# Patient Record
Sex: FEMALE | URBAN - METROPOLITAN AREA
[De-identification: names, ages, dates, MRNs, and addresses within clinical notes are randomized per-mention and may not be internally consistent; named-entity substitution may affect disease eponyms.]

---

## 2017-10-23 ENCOUNTER — EMERGENCY (EMERGENCY)
Facility: HOSPITAL | Age: 27
LOS: 1 days | Discharge: HOME | End: 2017-10-23
Attending: EMERGENCY MEDICINE
Payer: MEDICAID

## 2017-10-23 VITALS
WEIGHT: 97 LBS | TEMPERATURE: 98 F | SYSTOLIC BLOOD PRESSURE: 101 MMHG | OXYGEN SATURATION: 100 % | DIASTOLIC BLOOD PRESSURE: 68 MMHG | RESPIRATION RATE: 16 BRPM | HEIGHT: 62 IN | HEART RATE: 96 BPM

## 2017-10-23 VITALS
OXYGEN SATURATION: 100 % | DIASTOLIC BLOOD PRESSURE: 70 MMHG | RESPIRATION RATE: 90 BRPM | SYSTOLIC BLOOD PRESSURE: 100 MMHG | HEART RATE: 72 BPM | TEMPERATURE: 98 F

## 2017-10-23 DIAGNOSIS — R51 HEADACHE: ICD-10-CM

## 2017-10-23 DIAGNOSIS — Z88.0 ALLERGY STATUS TO PENICILLIN: ICD-10-CM

## 2017-10-23 LAB
ALBUMIN SERPL ELPH-MCNC: 3 G/DL — LOW (ref 3.5–5)
ALP SERPL-CCNC: 40 U/L — SIGNIFICANT CHANGE UP (ref 40–120)
ALT FLD-CCNC: 27 U/L DA — SIGNIFICANT CHANGE UP (ref 10–60)
ANION GAP SERPL CALC-SCNC: 9 MMOL/L — SIGNIFICANT CHANGE UP (ref 5–17)
APPEARANCE UR: CLEAR — SIGNIFICANT CHANGE UP
AST SERPL-CCNC: 32 U/L — SIGNIFICANT CHANGE UP (ref 10–40)
BILIRUB SERPL-MCNC: 0.4 MG/DL — SIGNIFICANT CHANGE UP (ref 0.2–1.2)
BILIRUB UR-MCNC: NEGATIVE — SIGNIFICANT CHANGE UP
BUN SERPL-MCNC: 11 MG/DL — SIGNIFICANT CHANGE UP (ref 7–18)
CALCIUM SERPL-MCNC: 8.7 MG/DL — SIGNIFICANT CHANGE UP (ref 8.4–10.5)
CHLORIDE SERPL-SCNC: 103 MMOL/L — SIGNIFICANT CHANGE UP (ref 96–108)
CO2 SERPL-SCNC: 24 MMOL/L — SIGNIFICANT CHANGE UP (ref 22–31)
COLOR SPEC: YELLOW — SIGNIFICANT CHANGE UP
CREAT SERPL-MCNC: 0.57 MG/DL — SIGNIFICANT CHANGE UP (ref 0.5–1.3)
DIFF PNL FLD: ABNORMAL
GLUCOSE SERPL-MCNC: 102 MG/DL — HIGH (ref 70–99)
GLUCOSE UR QL: NEGATIVE — SIGNIFICANT CHANGE UP
HCG SERPL-ACNC: <1 MIU/ML — SIGNIFICANT CHANGE UP
HCT VFR BLD CALC: 35.4 % — SIGNIFICANT CHANGE UP (ref 34.5–45)
HGB BLD-MCNC: 10.8 G/DL — LOW (ref 11.5–15.5)
KETONES UR-MCNC: ABNORMAL
LEUKOCYTE ESTERASE UR-ACNC: ABNORMAL
MCHC RBC-ENTMCNC: 26.9 PG — LOW (ref 27–34)
MCHC RBC-ENTMCNC: 30.7 GM/DL — LOW (ref 32–36)
MCV RBC AUTO: 87.5 FL — SIGNIFICANT CHANGE UP (ref 80–100)
NITRITE UR-MCNC: NEGATIVE — SIGNIFICANT CHANGE UP
PH UR: 5 — SIGNIFICANT CHANGE UP (ref 5–8)
PLATELET # BLD AUTO: 326 K/UL — SIGNIFICANT CHANGE UP (ref 150–400)
POTASSIUM SERPL-MCNC: 3.5 MMOL/L — SIGNIFICANT CHANGE UP (ref 3.5–5.3)
POTASSIUM SERPL-SCNC: 3.5 MMOL/L — SIGNIFICANT CHANGE UP (ref 3.5–5.3)
PROT SERPL-MCNC: 8.2 G/DL — SIGNIFICANT CHANGE UP (ref 6–8.3)
PROT UR-MCNC: 30 MG/DL
RBC # BLD: 4.04 M/UL — SIGNIFICANT CHANGE UP (ref 3.8–5.2)
RBC # FLD: 14.6 % — HIGH (ref 10.3–14.5)
SODIUM SERPL-SCNC: 136 MMOL/L — SIGNIFICANT CHANGE UP (ref 135–145)
SP GR SPEC: 1.02 — SIGNIFICANT CHANGE UP (ref 1.01–1.02)
UROBILINOGEN FLD QL: NEGATIVE — SIGNIFICANT CHANGE UP
WBC # BLD: 4.4 K/UL — SIGNIFICANT CHANGE UP (ref 3.8–10.5)
WBC # FLD AUTO: 4.4 K/UL — SIGNIFICANT CHANGE UP (ref 3.8–10.5)

## 2017-10-23 PROCEDURE — 81001 URINALYSIS AUTO W/SCOPE: CPT

## 2017-10-23 PROCEDURE — 70450 CT HEAD/BRAIN W/O DYE: CPT | Mod: 26

## 2017-10-23 PROCEDURE — 96375 TX/PRO/DX INJ NEW DRUG ADDON: CPT

## 2017-10-23 PROCEDURE — 96374 THER/PROPH/DIAG INJ IV PUSH: CPT

## 2017-10-23 PROCEDURE — 85027 COMPLETE CBC AUTOMATED: CPT

## 2017-10-23 PROCEDURE — 70450 CT HEAD/BRAIN W/O DYE: CPT

## 2017-10-23 PROCEDURE — 99284 EMERGENCY DEPT VISIT MOD MDM: CPT | Mod: 25

## 2017-10-23 PROCEDURE — 84702 CHORIONIC GONADOTROPIN TEST: CPT

## 2017-10-23 PROCEDURE — 87086 URINE CULTURE/COLONY COUNT: CPT

## 2017-10-23 PROCEDURE — 80053 COMPREHEN METABOLIC PANEL: CPT

## 2017-10-23 RX ORDER — METOCLOPRAMIDE HCL 10 MG
10 TABLET ORAL ONCE
Qty: 0 | Refills: 0 | Status: COMPLETED | OUTPATIENT
Start: 2017-10-23 | End: 2017-10-23

## 2017-10-23 RX ORDER — SODIUM CHLORIDE 9 MG/ML
1000 INJECTION, SOLUTION INTRAVENOUS
Qty: 0 | Refills: 0 | Status: DISCONTINUED | OUTPATIENT
Start: 2017-10-23 | End: 2017-10-27

## 2017-10-23 RX ORDER — DIPHENHYDRAMINE HCL 50 MG
25 CAPSULE ORAL ONCE
Qty: 0 | Refills: 0 | Status: COMPLETED | OUTPATIENT
Start: 2017-10-23 | End: 2017-10-23

## 2017-10-23 RX ORDER — ACETAMINOPHEN 500 MG
1000 TABLET ORAL ONCE
Qty: 0 | Refills: 0 | Status: COMPLETED | OUTPATIENT
Start: 2017-10-23 | End: 2017-10-23

## 2017-10-23 RX ORDER — KETOROLAC TROMETHAMINE 30 MG/ML
30 SYRINGE (ML) INJECTION ONCE
Qty: 0 | Refills: 0 | Status: DISCONTINUED | OUTPATIENT
Start: 2017-10-23 | End: 2017-10-23

## 2017-10-23 RX ORDER — NITROFURANTOIN MACROCRYSTAL 50 MG
1 CAPSULE ORAL
Qty: 14 | Refills: 0 | OUTPATIENT
Start: 2017-10-23 | End: 2017-10-30

## 2017-10-23 RX ORDER — NITROFURANTOIN MACROCRYSTAL 50 MG
100 CAPSULE ORAL
Qty: 0 | Refills: 0 | Status: DISCONTINUED | OUTPATIENT
Start: 2017-10-23 | End: 2017-10-27

## 2017-10-23 RX ORDER — SODIUM CHLORIDE 9 MG/ML
1000 INJECTION INTRAMUSCULAR; INTRAVENOUS; SUBCUTANEOUS ONCE
Qty: 0 | Refills: 0 | Status: COMPLETED | OUTPATIENT
Start: 2017-10-23 | End: 2017-10-23

## 2017-10-23 RX ADMIN — Medication 1000 MILLIGRAM(S): at 07:31

## 2017-10-23 RX ADMIN — Medication 30 MILLIGRAM(S): at 05:27

## 2017-10-23 RX ADMIN — SODIUM CHLORIDE 1000 MILLILITER(S): 9 INJECTION, SOLUTION INTRAVENOUS at 05:25

## 2017-10-23 RX ADMIN — Medication 100 MILLIGRAM(S): at 05:25

## 2017-10-23 RX ADMIN — Medication 400 MILLIGRAM(S): at 06:33

## 2017-10-23 RX ADMIN — Medication 10 MILLIGRAM(S): at 02:40

## 2017-10-23 RX ADMIN — Medication 25 MILLIGRAM(S): at 02:39

## 2017-10-23 RX ADMIN — Medication 30 MILLIGRAM(S): at 06:35

## 2017-10-23 RX ADMIN — SODIUM CHLORIDE 1000 MILLILITER(S): 9 INJECTION INTRAMUSCULAR; INTRAVENOUS; SUBCUTANEOUS at 02:39

## 2017-10-23 NOTE — ED PROVIDER NOTE - OBJECTIVE STATEMENT
26 y/o F w/ no significant PMHx presents to the ED c/o worsening, throbbing HA x3 days. Pt also notes hx of HA long time ago and states Sx are similar to previous [facility]. Pt has had the following tests performed: abd US, cbc w/ differential, thyroid function test, hormone panel, prolactin test, hemoglobin a1c, UA, CCP, ferritin, Folate, HLA-B27, Iron & TIBC, LDH, Lyme Dz, AB reflex west-blot IGG/IGM, protein electro serum, vitamin b12, CMP, Creatinine w/ GFR, arthritis panel, anti-nuclear antibodies reflex IFA, Liquid panel, LDL cholesterol, CALC [5], CT A/P, chest (thorax), and soft neck tissue. Pt is on medroxyprogesterone 10 mg. Pt states she took Tylenol & Ibuprofen to no relief. PMD: Dr. Myles. Pt denies fever, chills, photophobia, weakness in arms or legs, or any other complaints. Pt is allergic to Penicillin. 28 y/o F w/ no significant PMHx presents to the ED c/o worsening, throbbing HA x3 days. Pt also notes hx of HA in past, but hasn't had one for a long time, however she states Sx are similar to previous HA. Pt has had non specific s/s such as joints pains/ weight loss, lymphadenopathy, which prompted extensive workup- has had the following tests performed this year: abd US, cbc w/ differential, thyroid function test, hormone panel, prolactin test, hemoglobin a1c, UA, CCP, ferritin, Folate, HLA-B27, Iron & TIBC, LDH, Lyme Dz, AB reflex west-blot IGG/IGM, protein electro serum, vitamin b12, CMP, Creatinine w/ GFR, arthritis panel, anti-nuclear antibodies reflex IFA, Liquid panel, LDL cholesterol, CALC [5], CT A/P, chest (thorax), and soft neck tissue. Pt is on medroxyprogesterone 10 mg. Has seen Rheum/ hem-onc, syndrome thought to be Rheum but no specific dx given as of yet.  Pt states she took Tylenol & Ibuprofen to no relief. PMD: Dr. Myles (has appoint today). Pt denies fever, chills, photophobia, weakness in arms or legs, or any other complaints. Pt is allergic to Penicillin.

## 2017-10-24 ENCOUNTER — EMERGENCY (EMERGENCY)
Facility: HOSPITAL | Age: 27
LOS: 1 days | Discharge: HOME | End: 2017-10-24
Attending: EMERGENCY MEDICINE
Payer: MEDICAID

## 2017-10-24 VITALS
HEART RATE: 69 BPM | DIASTOLIC BLOOD PRESSURE: 65 MMHG | SYSTOLIC BLOOD PRESSURE: 94 MMHG | RESPIRATION RATE: 17 BRPM | OXYGEN SATURATION: 99 %

## 2017-10-24 VITALS
WEIGHT: 98.11 LBS | RESPIRATION RATE: 18 BRPM | HEIGHT: 62 IN | SYSTOLIC BLOOD PRESSURE: 100 MMHG | TEMPERATURE: 99 F | HEART RATE: 85 BPM | DIASTOLIC BLOOD PRESSURE: 72 MMHG | OXYGEN SATURATION: 98 %

## 2017-10-24 DIAGNOSIS — Z88.0 ALLERGY STATUS TO PENICILLIN: ICD-10-CM

## 2017-10-24 DIAGNOSIS — G43.911 MIGRAINE, UNSPECIFIED, INTRACTABLE, WITH STATUS MIGRAINOSUS: ICD-10-CM

## 2017-10-24 LAB
CULTURE RESULTS: NO GROWTH — SIGNIFICANT CHANGE UP
SPECIMEN SOURCE: SIGNIFICANT CHANGE UP

## 2017-10-24 PROCEDURE — 96374 THER/PROPH/DIAG INJ IV PUSH: CPT

## 2017-10-24 PROCEDURE — 99284 EMERGENCY DEPT VISIT MOD MDM: CPT | Mod: 25

## 2017-10-24 PROCEDURE — 96375 TX/PRO/DX INJ NEW DRUG ADDON: CPT

## 2017-10-24 PROCEDURE — 99284 EMERGENCY DEPT VISIT MOD MDM: CPT

## 2017-10-24 RX ORDER — METOCLOPRAMIDE HCL 10 MG
10 TABLET ORAL ONCE
Qty: 0 | Refills: 0 | Status: COMPLETED | OUTPATIENT
Start: 2017-10-24 | End: 2017-10-24

## 2017-10-24 RX ORDER — DIPHENHYDRAMINE HCL 50 MG
25 CAPSULE ORAL ONCE
Qty: 0 | Refills: 0 | Status: COMPLETED | OUTPATIENT
Start: 2017-10-24 | End: 2017-10-24

## 2017-10-24 RX ORDER — KETOROLAC TROMETHAMINE 30 MG/ML
30 SYRINGE (ML) INJECTION ONCE
Qty: 0 | Refills: 0 | Status: DISCONTINUED | OUTPATIENT
Start: 2017-10-24 | End: 2017-10-24

## 2017-10-24 RX ORDER — DEXAMETHASONE 0.5 MG/5ML
6 ELIXIR ORAL ONCE
Qty: 0 | Refills: 0 | Status: COMPLETED | OUTPATIENT
Start: 2017-10-24 | End: 2017-10-24

## 2017-10-24 RX ORDER — SODIUM CHLORIDE 9 MG/ML
1000 INJECTION INTRAMUSCULAR; INTRAVENOUS; SUBCUTANEOUS ONCE
Qty: 0 | Refills: 0 | Status: COMPLETED | OUTPATIENT
Start: 2017-10-24 | End: 2017-10-24

## 2017-10-24 RX ADMIN — Medication 10 MILLIGRAM(S): at 12:54

## 2017-10-24 RX ADMIN — Medication 30 MILLIGRAM(S): at 13:39

## 2017-10-24 RX ADMIN — Medication 30 MILLIGRAM(S): at 12:54

## 2017-10-24 RX ADMIN — Medication 6 MILLIGRAM(S): at 13:53

## 2017-10-24 RX ADMIN — SODIUM CHLORIDE 1000 MILLILITER(S): 9 INJECTION INTRAMUSCULAR; INTRAVENOUS; SUBCUTANEOUS at 12:54

## 2017-10-24 RX ADMIN — Medication 25 MILLIGRAM(S): at 12:54

## 2017-10-24 NOTE — ED ADULT TRIAGE NOTE - CHIEF COMPLAINT QUOTE
accompanied by father c/o dizziness , vomiting after taking Sumatripan from last night , was here 2 days ago for migraine headaches

## 2017-10-24 NOTE — ED ADULT NURSE NOTE - OBJECTIVE STATEMENT
pt from home c/o of generalized headache pt is alert oriented x3 no acute respiratory distress noted no active vomiting at this time

## 2017-10-24 NOTE — ED PROVIDER NOTE - CRANIAL NERVE AND PUPILLARY EXAM
gag reflex intact/tongue is midline/cranial nerves 2-12 intact/central and peripheral vision intact/extra-ocular movements intact

## 2017-10-24 NOTE — ED PROVIDER NOTE - MEDICAL DECISION MAKING DETAILS
Pt had extensive workup yesterday to include negative head CT and no neuro deficits today.  Will treat migraine and reassess for relief.

## 2017-10-24 NOTE — ED PROVIDER NOTE - CROS ED NEURO POS
Your current Orthopaedic problem we are working together to treat is:  LeftTotal Knee Replacement.    It is recommended you schedule a follow-up appointment with Griffin Steven MD  4 months after surgery.     Office hours are 8:00 am to 5:00 pm Monday through Friday. If it is urgent that you speak with someone outside of these hours, our [facility] Call Center will be able to assist you. You can reach the office by calling the [place] central scheduling line at [phone].     We want to give you the best care possible. If you receive a Press Ganey survey from our office, please take the time to fill out the survey and return it in the envelope provided. If you will be giving us a score of 8 or less on the survey please consider calling to discuss this with us personally. Your feedback helps us know how we are doing and we really appreciate it.     Thank you for choosing [facility] as your Orthopaedic provider!    Continue to ride your bike and work on range of motion of your knee.     HEADACHE/DIFFICULTY WALKING / IMBALANCE

## 2017-10-24 NOTE — ED PROVIDER NOTE - OBJECTIVE STATEMENT
28 y/o female who has had recent negative extensive workup to include CT head, neck, prolactin, Lyme titers, CBC, CMP p/w weakness and continued migraine after taking Imitrex for the 1st time yesterday.  She took 4 doses throughout the day and then her father noted that she was less responsive and somnolent.   Pt c/o diffuse dull headache but denies f/c/r, neck pain/stiffness or rash.  Pt has had nausea but no vomiting.  Pt was seen in this ED in the last 24 hours and had a negative workup.

## 2018-05-08 ENCOUNTER — APPOINTMENT (OUTPATIENT)
Dept: RHEUMATOLOGY | Facility: CLINIC | Age: 28
End: 2018-05-08
Payer: MEDICAID

## 2018-05-08 ENCOUNTER — LABORATORY RESULT (OUTPATIENT)
Age: 28
End: 2018-05-08

## 2018-05-08 VITALS
OXYGEN SATURATION: 97 % | WEIGHT: 112 LBS | TEMPERATURE: 98.1 F | SYSTOLIC BLOOD PRESSURE: 103 MMHG | HEIGHT: 62 IN | DIASTOLIC BLOOD PRESSURE: 67 MMHG | HEART RATE: 80 BPM | BODY MASS INDEX: 20.61 KG/M2

## 2018-05-08 DIAGNOSIS — Z87.39 PERSONAL HISTORY OF OTHER DISEASES OF THE MUSCULOSKELETAL SYSTEM AND CONNECTIVE TISSUE: ICD-10-CM

## 2018-05-08 DIAGNOSIS — Z86.59 PERSONAL HISTORY OF OTHER MENTAL AND BEHAVIORAL DISORDERS: ICD-10-CM

## 2018-05-08 DIAGNOSIS — Z78.9 OTHER SPECIFIED HEALTH STATUS: ICD-10-CM

## 2018-05-08 LAB
ALBUMIN SERPL ELPH-MCNC: 4.1 G/DL
ALP BLD-CCNC: 46 U/L
ALT SERPL-CCNC: 44 U/L
ANION GAP SERPL CALC-SCNC: 17 MMOL/L
APPEARANCE: CLEAR
APTT BLD: 32.8 SEC
AST SERPL-CCNC: 31 U/L
BACTERIA: NEGATIVE
BASOPHILS # BLD AUTO: 0.02 K/UL
BASOPHILS NFR BLD AUTO: 0.3 %
BILIRUB SERPL-MCNC: 0.4 MG/DL
BILIRUBIN URINE: NEGATIVE
BLOOD URINE: NEGATIVE
BUN SERPL-MCNC: 12 MG/DL
CALCIUM SERPL-MCNC: 9.4 MG/DL
CHLORIDE SERPL-SCNC: 99 MMOL/L
CO2 SERPL-SCNC: 25 MMOL/L
COLOR: YELLOW
CREAT SERPL-MCNC: 0.73 MG/DL
CREAT SPEC-SCNC: 26 MG/DL
CREAT/PROT UR: 0.2 RATIO
CRP SERPL-MCNC: <0.2 MG/DL
EOSINOPHIL # BLD AUTO: 0.01 K/UL
EOSINOPHIL NFR BLD AUTO: 0.1 %
ERYTHROCYTE [SEDIMENTATION RATE] IN BLOOD BY WESTERGREN METHOD: 25 MM/HR
GLUCOSE QUALITATIVE U: NEGATIVE MG/DL
GLUCOSE SERPL-MCNC: 141 MG/DL
HCT VFR BLD CALC: 41.4 %
HGB BLD-MCNC: 13.1 G/DL
HYALINE CASTS: 1 /LPF
IMM GRANULOCYTES NFR BLD AUTO: 1.4 %
KETONES URINE: NEGATIVE
LEUKOCYTE ESTERASE URINE: ABNORMAL
LYMPHOCYTES # BLD AUTO: 1.29 K/UL
LYMPHOCYTES NFR BLD AUTO: 16.4 %
MAN DIFF?: NORMAL
MCHC RBC-ENTMCNC: 28 PG
MCHC RBC-ENTMCNC: 31.6 GM/DL
MCV RBC AUTO: 88.5 FL
MICROSCOPIC-UA: NORMAL
MONOCYTES # BLD AUTO: 0.11 K/UL
MONOCYTES NFR BLD AUTO: 1.4 %
NEUTROPHILS # BLD AUTO: 6.34 K/UL
NEUTROPHILS NFR BLD AUTO: 80.4 %
NITRITE URINE: NEGATIVE
PH URINE: 7
PLATELET # BLD AUTO: 495 K/UL
POTASSIUM SERPL-SCNC: 4.4 MMOL/L
PROT SERPL-MCNC: 8.1 G/DL
PROT UR-MCNC: 4 MG/DL
PROTEIN URINE: NEGATIVE MG/DL
RBC # BLD: 4.68 M/UL
RBC # FLD: 16.1 %
RED BLOOD CELLS URINE: 0 /HPF
RHEUMATOID FACT SER QL: <7 IU/ML
SODIUM SERPL-SCNC: 141 MMOL/L
SPECIFIC GRAVITY URINE: 1.01
SQUAMOUS EPITHELIAL CELLS: 2 /HPF
THYROGLOB AB SERPL-ACNC: <20 IU/ML
THYROPEROXIDASE AB SERPL IA-ACNC: 10.7 IU/ML
TSH SERPL-ACNC: 1.27 UIU/ML
UROBILINOGEN URINE: NEGATIVE MG/DL
WBC # FLD AUTO: 7.88 K/UL
WHITE BLOOD CELLS URINE: 1 /HPF

## 2018-05-08 PROCEDURE — 99204 OFFICE O/P NEW MOD 45 MIN: CPT

## 2018-05-09 LAB
25(OH)D3 SERPL-MCNC: 23.2 NG/ML
B2 GLYCOPROT1 AB SER QL: POSITIVE
CARDIOLIPIN AB SER IA-ACNC: NEGATIVE
CCP AB SER IA-ACNC: <8 UNITS
CENTROMERE IGG SER-ACNC: <0.2 AL
DEPRECATED KAPPA LC FREE/LAMBDA SER: 0.83 RATIO
DSDNA AB SER-ACNC: 30 IU/ML
ENA RNP AB SER IA-ACNC: 0.4 AL
ENA SCL70 IGG SER IA-ACNC: <0.2 AL
ENA SM AB SER IA-ACNC: 2.1 AL
ENA SS-A AB SER IA-ACNC: >8 AL
ENA SS-B AB SER IA-ACNC: <0.2 AL
IGA SER QL IEP: 213 MG/DL
IGG SER QL IEP: 2140 MG/DL
IGM SER QL IEP: 117 MG/DL
KAPPA LC CSF-MCNC: 2.19 MG/DL
KAPPA LC SERPL-MCNC: 1.81 MG/DL
RF+CCP IGG SER-IMP: NEGATIVE

## 2018-05-10 LAB
ALBUMIN MFR SERPL ELPH: 52.3 %
ALBUMIN SERPL-MCNC: 4.2 G/DL
ALBUMIN/GLOB SERPL: 1.1 RATIO
ALPHA1 GLOB MFR SERPL ELPH: 3.7 %
ALPHA1 GLOB SERPL ELPH-MCNC: 0.3 G/DL
ALPHA2 GLOB MFR SERPL ELPH: 8.4 %
ALPHA2 GLOB SERPL ELPH-MCNC: 0.7 G/DL
B-GLOBULIN MFR SERPL ELPH: 9.5 %
B-GLOBULIN SERPL ELPH-MCNC: 0.8 G/DL
GAMMA GLOB FLD ELPH-MCNC: 2.1 G/DL
GAMMA GLOB MFR SERPL ELPH: 26.1 %
IGA 24H UR QL IFE: NORMAL
INTERPRETATION SERPL IEP-IMP: NORMAL
M PROTEIN SPEC IFE-MCNC: NORMAL
PROT SERPL-MCNC: 8.1 G/DL
PROT SERPL-MCNC: 8.1 G/DL

## 2018-05-11 LAB — ANA SER IF-ACNC: NEGATIVE

## 2018-05-12 LAB
IGG4 SER-MCNC: 42.7 MG/DL
RNA POLYMERASE III IGG: 14.8 U

## 2018-05-14 LAB
ALBUPE: 55.6 %
ALPHA1UPE: 8.6 %
ALPHA2UPE: 15.6 %
BETAUPE: 7 %
GAMMAUPE: 13.2 %
IGA 24H UR QL IFE: NORMAL
KAPPA LC 24H UR QL: NORMAL
PROT PATTERN 24H UR ELPH-IMP: NORMAL
PROT UR-MCNC: 5 MG/DL
PROT UR-MCNC: <4 MG/DL

## 2018-05-15 LAB
ALBUPE: 55.6 %
ALPHA1UPE: 8.6 %
ALPHA2UPE: 15.6 %
BETAUPE: 7 %
CREAT 24H UR-MCNC: NORMAL G/24 H
CREATININE UR (MAYO): 41 MG/DL
GAMMAUPE: 13.2 %
IGA 24H UR QL IFE: NORMAL
IL6 SERPL-MCNC: 1.6 PG/ML
KAPPA LC 24H UR QL: NORMAL
PROT PATTERN 24H UR ELPH-IMP: NORMAL
PROT UR-MCNC: 5 MG/DL
PROT UR-MCNC: <4 MG/DL
SPECIMEN VOL 24H UR: NORMAL ML

## 2018-05-21 ENCOUNTER — TRANSCRIPTION ENCOUNTER (OUTPATIENT)
Age: 28
End: 2018-05-21

## 2018-05-21 ENCOUNTER — APPOINTMENT (OUTPATIENT)
Dept: RHEUMATOLOGY | Facility: CLINIC | Age: 28
End: 2018-05-21
Payer: MEDICAID

## 2018-05-21 VITALS
RESPIRATION RATE: 16 BRPM | WEIGHT: 114 LBS | TEMPERATURE: 98.4 F | DIASTOLIC BLOOD PRESSURE: 46 MMHG | HEART RATE: 83 BPM | BODY MASS INDEX: 20.98 KG/M2 | SYSTOLIC BLOOD PRESSURE: 88 MMHG | OXYGEN SATURATION: 98 % | HEIGHT: 62 IN

## 2018-05-21 DIAGNOSIS — A68.9 RELAPSING FEVER, UNSPECIFIED: ICD-10-CM

## 2018-05-21 PROCEDURE — 99213 OFFICE O/P EST LOW 20 MIN: CPT

## 2018-05-29 ENCOUNTER — LABORATORY RESULT (OUTPATIENT)
Age: 28
End: 2018-05-29

## 2018-06-18 ENCOUNTER — APPOINTMENT (OUTPATIENT)
Dept: RHEUMATOLOGY | Facility: CLINIC | Age: 28
End: 2018-06-18
Payer: MEDICAID

## 2018-06-18 VITALS
HEIGHT: 62 IN | DIASTOLIC BLOOD PRESSURE: 52 MMHG | HEART RATE: 67 BPM | OXYGEN SATURATION: 99 % | SYSTOLIC BLOOD PRESSURE: 80 MMHG | RESPIRATION RATE: 16 BRPM | WEIGHT: 116 LBS | BODY MASS INDEX: 21.35 KG/M2 | TEMPERATURE: 97.7 F

## 2018-06-18 PROBLEM — Z78.9 NON-SMOKER: Status: ACTIVE | Noted: 2018-05-08

## 2018-06-18 PROCEDURE — 99213 OFFICE O/P EST LOW 20 MIN: CPT

## 2018-06-18 RX ORDER — GLUC/MSM/COLGN2/HYAL/ANTIARTH3 375-375-20
TABLET ORAL
Refills: 0 | Status: ACTIVE | COMMUNITY

## 2018-06-18 RX ORDER — MULTIVITAMIN
TABLET ORAL
Refills: 0 | Status: ACTIVE | COMMUNITY

## 2018-06-18 RX ORDER — PSYLLIUM HUSK 0.4 G
CAPSULE ORAL
Refills: 0 | Status: ACTIVE | COMMUNITY

## 2018-06-18 RX ORDER — MAGNESIUM OXIDE/MAG AA CHELATE 300 MG
CAPSULE ORAL
Refills: 0 | Status: ACTIVE | COMMUNITY

## 2018-06-20 ENCOUNTER — TRANSCRIPTION ENCOUNTER (OUTPATIENT)
Age: 28
End: 2018-06-20

## 2018-06-26 ENCOUNTER — APPOINTMENT (OUTPATIENT)
Dept: OPHTHALMOLOGY | Facility: CLINIC | Age: 28
End: 2018-06-26
Payer: MEDICAID

## 2018-06-26 PROCEDURE — 92083 EXTENDED VISUAL FIELD XM: CPT

## 2018-06-26 PROCEDURE — 92004 COMPRE OPH EXAM NEW PT 1/>: CPT

## 2018-06-26 PROCEDURE — 92134 CPTRZ OPH DX IMG PST SGM RTA: CPT

## 2018-07-24 ENCOUNTER — TRANSCRIPTION ENCOUNTER (OUTPATIENT)
Age: 28
End: 2018-07-24

## 2018-08-14 ENCOUNTER — RX RENEWAL (OUTPATIENT)
Age: 28
End: 2018-08-14

## 2018-08-14 LAB
ALBUMIN SERPL ELPH-MCNC: 3.9 G/DL
ALP BLD-CCNC: 40 U/L
ALT SERPL-CCNC: 11 U/L
ANION GAP SERPL CALC-SCNC: 16 MMOL/L
APPEARANCE: CLEAR
AST SERPL-CCNC: 23 U/L
BACTERIA: NEGATIVE
BASOPHILS # BLD AUTO: 0.01 K/UL
BASOPHILS NFR BLD AUTO: 0.3 %
BILIRUB SERPL-MCNC: 0.7 MG/DL
BILIRUBIN URINE: NEGATIVE
BLOOD URINE: NEGATIVE
BUN SERPL-MCNC: 9 MG/DL
C3 SERPL-MCNC: 108 MG/DL
C4 SERPL-MCNC: 23 MG/DL
CALCIUM SERPL-MCNC: 9.5 MG/DL
CHLORIDE SERPL-SCNC: 99 MMOL/L
CO2 SERPL-SCNC: 23 MMOL/L
COLOR: YELLOW
CREAT SERPL-MCNC: 0.55 MG/DL
CREAT SPEC-SCNC: 18 MG/DL
CREAT/PROT UR: NORMAL
DSDNA AB SER-ACNC: 18 IU/ML
EOSINOPHIL # BLD AUTO: 0.18 K/UL
EOSINOPHIL NFR BLD AUTO: 5.6 %
GLUCOSE QUALITATIVE U: NEGATIVE MG/DL
GLUCOSE SERPL-MCNC: 93 MG/DL
HCT VFR BLD CALC: 43.7 %
HGB BLD-MCNC: 13.9 G/DL
HYALINE CASTS: 0 /LPF
IMM GRANULOCYTES NFR BLD AUTO: 0 %
KETONES URINE: NEGATIVE
LEUKOCYTE ESTERASE URINE: NEGATIVE
LYMPHOCYTES # BLD AUTO: 1.13 K/UL
LYMPHOCYTES NFR BLD AUTO: 34.9 %
MAN DIFF?: NORMAL
MCHC RBC-ENTMCNC: 28.7 PG
MCHC RBC-ENTMCNC: 31.8 GM/DL
MCV RBC AUTO: 90.1 FL
MICROSCOPIC-UA: NORMAL
MONOCYTES # BLD AUTO: 0.12 K/UL
MONOCYTES NFR BLD AUTO: 3.7 %
NEUTROPHILS # BLD AUTO: 1.8 K/UL
NEUTROPHILS NFR BLD AUTO: 55.5 %
NITRITE URINE: NEGATIVE
PH URINE: 8
PLATELET # BLD AUTO: 349 K/UL
POTASSIUM SERPL-SCNC: 4.2 MMOL/L
PROT SERPL-MCNC: 8.1 G/DL
PROT UR-MCNC: <4 MG/DL
PROTEIN URINE: NEGATIVE MG/DL
RBC # BLD: 4.85 M/UL
RBC # FLD: 15.2 %
RED BLOOD CELLS URINE: 1 /HPF
SODIUM SERPL-SCNC: 138 MMOL/L
SPECIFIC GRAVITY URINE: 1.01
SQUAMOUS EPITHELIAL CELLS: 0 /HPF
UROBILINOGEN URINE: NEGATIVE MG/DL
WBC # FLD AUTO: 3.24 K/UL
WHITE BLOOD CELLS URINE: 0 /HPF

## 2018-08-20 ENCOUNTER — APPOINTMENT (OUTPATIENT)
Dept: RHEUMATOLOGY | Facility: CLINIC | Age: 28
End: 2018-08-20
Payer: MEDICAID

## 2018-08-20 VITALS
HEIGHT: 62 IN | DIASTOLIC BLOOD PRESSURE: 69 MMHG | OXYGEN SATURATION: 95 % | SYSTOLIC BLOOD PRESSURE: 108 MMHG | TEMPERATURE: 98.5 F | BODY MASS INDEX: 21.71 KG/M2 | HEART RATE: 75 BPM | RESPIRATION RATE: 16 BRPM | WEIGHT: 118 LBS

## 2018-08-20 PROCEDURE — 99213 OFFICE O/P EST LOW 20 MIN: CPT

## 2018-08-28 ENCOUNTER — TRANSCRIPTION ENCOUNTER (OUTPATIENT)
Age: 28
End: 2018-08-28

## 2018-09-04 ENCOUNTER — TRANSCRIPTION ENCOUNTER (OUTPATIENT)
Age: 28
End: 2018-09-04

## 2018-09-04 PROBLEM — Z87.39 HISTORY OF TENDINITIS: Status: RESOLVED | Noted: 2018-09-04 | Resolved: 2018-09-04

## 2018-09-04 PROBLEM — Z86.59 HISTORY OF DEPRESSION: Status: RESOLVED | Noted: 2018-09-04 | Resolved: 2018-09-04

## 2018-09-05 ENCOUNTER — TRANSCRIPTION ENCOUNTER (OUTPATIENT)
Age: 28
End: 2018-09-05

## 2018-09-13 ENCOUNTER — TRANSCRIPTION ENCOUNTER (OUTPATIENT)
Age: 28
End: 2018-09-13

## 2018-09-13 ENCOUNTER — RX RENEWAL (OUTPATIENT)
Age: 28
End: 2018-09-13

## 2018-09-17 ENCOUNTER — TRANSCRIPTION ENCOUNTER (OUTPATIENT)
Age: 28
End: 2018-09-17

## 2018-09-18 ENCOUNTER — TRANSCRIPTION ENCOUNTER (OUTPATIENT)
Age: 28
End: 2018-09-18

## 2018-09-20 ENCOUNTER — LABORATORY RESULT (OUTPATIENT)
Age: 28
End: 2018-09-20

## 2018-09-20 ENCOUNTER — APPOINTMENT (OUTPATIENT)
Dept: DERMATOLOGY | Facility: CLINIC | Age: 28
End: 2018-09-20
Payer: MEDICAID

## 2018-09-20 ENCOUNTER — TRANSCRIPTION ENCOUNTER (OUTPATIENT)
Age: 28
End: 2018-09-20

## 2018-09-20 VITALS
SYSTOLIC BLOOD PRESSURE: 96 MMHG | TEMPERATURE: 98.4 F | HEIGHT: 62 IN | BODY MASS INDEX: 21.53 KG/M2 | OXYGEN SATURATION: 100 % | WEIGHT: 117 LBS | HEART RATE: 75 BPM | DIASTOLIC BLOOD PRESSURE: 70 MMHG

## 2018-09-20 PROCEDURE — 99204 OFFICE O/P NEW MOD 45 MIN: CPT | Mod: GC

## 2018-09-21 LAB
BASOPHILS # BLD AUTO: 0.01 K/UL
BASOPHILS NFR BLD AUTO: 0.3 %
EOSINOPHIL # BLD AUTO: 0.16 K/UL
EOSINOPHIL NFR BLD AUTO: 4.7 %
HBV SURFACE AB SER QL: REACTIVE
HBV SURFACE AG SER QL: NONREACTIVE
HCT VFR BLD CALC: 42.9 %
HCV AB SER QL: NONREACTIVE
HCV S/CO RATIO: 0.16 S/CO
HGB BLD-MCNC: 13.9 G/DL
IMM GRANULOCYTES NFR BLD AUTO: 0 %
LYMPHOCYTES # BLD AUTO: 0.94 K/UL
LYMPHOCYTES NFR BLD AUTO: 27.8 %
MAN DIFF?: NORMAL
MCHC RBC-ENTMCNC: 29.2 PG
MCHC RBC-ENTMCNC: 32.4 GM/DL
MCV RBC AUTO: 90.1 FL
MONOCYTES # BLD AUTO: 0.18 K/UL
MONOCYTES NFR BLD AUTO: 5.3 %
NEUTROPHILS # BLD AUTO: 2.09 K/UL
NEUTROPHILS NFR BLD AUTO: 61.9 %
PLATELET # BLD AUTO: 349 K/UL
RBC # BLD: 4.76 M/UL
RBC # FLD: 14.5 %
WBC # FLD AUTO: 3.38 K/UL

## 2018-09-23 LAB
HIV1+2 AB SPEC QL IA.RAPID: NONREACTIVE
M TB IFN-G BLD-IMP: ABNORMAL
QUANTIFERON TB PLUS MITOGEN MINUS NIL: 0.35 IU/ML
QUANTIFERON TB PLUS NIL: 0.03 IU/ML
QUANTIFERON TB PLUS TB1 MINUS NIL: 0 IU/ML
QUANTIFERON TB PLUS TB2 MINUS NIL: 0 IU/ML

## 2018-09-27 LAB
M TB IFN-G BLD-IMP: NEGATIVE
QUANTIFERON TB PLUS MITOGEN MINUS NIL: 1.14 IU/ML
QUANTIFERON TB PLUS NIL: 0.08 IU/ML
QUANTIFERON TB PLUS TB1 MINUS NIL: -0.01 IU/ML
QUANTIFERON TB PLUS TB2 MINUS NIL: 0 IU/ML

## 2018-10-09 ENCOUNTER — TRANSCRIPTION ENCOUNTER (OUTPATIENT)
Age: 28
End: 2018-10-09

## 2018-10-10 ENCOUNTER — OUTPATIENT (OUTPATIENT)
Dept: OUTPATIENT SERVICES | Facility: HOSPITAL | Age: 28
LOS: 1 days | Discharge: HOME | End: 2018-10-10

## 2018-10-10 DIAGNOSIS — D72.819 DECREASED WHITE BLOOD CELL COUNT, UNSPECIFIED: ICD-10-CM

## 2018-10-15 ENCOUNTER — TRANSCRIPTION ENCOUNTER (OUTPATIENT)
Age: 28
End: 2018-10-15

## 2018-10-16 ENCOUNTER — APPOINTMENT (OUTPATIENT)
Dept: HEMATOLOGY ONCOLOGY | Facility: CLINIC | Age: 28
End: 2018-10-16
Payer: MEDICAID

## 2018-10-16 ENCOUNTER — RESULT REVIEW (OUTPATIENT)
Age: 28
End: 2018-10-16

## 2018-10-16 VITALS
HEIGHT: 62.8 IN | OXYGEN SATURATION: 100 % | BODY MASS INDEX: 21.09 KG/M2 | DIASTOLIC BLOOD PRESSURE: 66 MMHG | HEART RATE: 68 BPM | TEMPERATURE: 98.5 F | SYSTOLIC BLOOD PRESSURE: 100 MMHG | RESPIRATION RATE: 16 BRPM | WEIGHT: 119.05 LBS

## 2018-10-16 LAB
BASOPHILS # BLD AUTO: 0.1 K/UL — SIGNIFICANT CHANGE UP (ref 0–0.2)
BASOPHILS NFR BLD AUTO: 1.4 % — SIGNIFICANT CHANGE UP (ref 0–2)
EOSINOPHIL # BLD AUTO: 0.4 K/UL — SIGNIFICANT CHANGE UP (ref 0–0.5)
EOSINOPHIL NFR BLD AUTO: 11.2 % — HIGH (ref 0–6)
HCT VFR BLD CALC: 39.8 % — SIGNIFICANT CHANGE UP (ref 34.5–45)
HGB BLD-MCNC: 13.5 G/DL — SIGNIFICANT CHANGE UP (ref 11.5–15.5)
LYMPHOCYTES # BLD AUTO: 1.4 K/UL — SIGNIFICANT CHANGE UP (ref 1–3.3)
LYMPHOCYTES # BLD AUTO: 35.8 % — SIGNIFICANT CHANGE UP (ref 13–44)
MCHC RBC-ENTMCNC: 29.8 PG — SIGNIFICANT CHANGE UP (ref 27–34)
MCHC RBC-ENTMCNC: 34 G/DL — SIGNIFICANT CHANGE UP (ref 32–36)
MCV RBC AUTO: 87.7 FL — SIGNIFICANT CHANGE UP (ref 80–100)
MONOCYTES # BLD AUTO: 0.2 K/UL — SIGNIFICANT CHANGE UP (ref 0–0.9)
MONOCYTES NFR BLD AUTO: 5.1 % — SIGNIFICANT CHANGE UP (ref 2–14)
NEUTROPHILS # BLD AUTO: 1.8 K/UL — SIGNIFICANT CHANGE UP (ref 1.8–7.4)
NEUTROPHILS NFR BLD AUTO: 46.5 % — SIGNIFICANT CHANGE UP (ref 43–77)
PLATELET # BLD AUTO: 292 K/UL — SIGNIFICANT CHANGE UP (ref 150–400)
RBC # BLD: 4.55 M/UL — SIGNIFICANT CHANGE UP (ref 3.8–5.2)
RBC # FLD: 12.8 % — SIGNIFICANT CHANGE UP (ref 10.3–14.5)
WBC # BLD: 3.8 K/UL — SIGNIFICANT CHANGE UP (ref 3.8–10.5)
WBC # FLD AUTO: 3.8 K/UL — SIGNIFICANT CHANGE UP (ref 3.8–10.5)

## 2018-10-16 PROCEDURE — 99205 OFFICE O/P NEW HI 60 MIN: CPT

## 2018-11-08 LAB
ALBUMIN SERPL ELPH-MCNC: 4.2 G/DL
ALP BLD-CCNC: 38 U/L
ALT SERPL-CCNC: 16 U/L
ANION GAP SERPL CALC-SCNC: 10 MMOL/L
AST SERPL-CCNC: 21 U/L
BASOPHILS # BLD AUTO: 0.03 K/UL
BASOPHILS NFR BLD AUTO: 1 %
BILIRUB SERPL-MCNC: 0.6 MG/DL
BUN SERPL-MCNC: 11 MG/DL
CALCIUM SERPL-MCNC: 9.5 MG/DL
CHLORIDE SERPL-SCNC: 102 MMOL/L
CO2 SERPL-SCNC: 27 MMOL/L
CREAT SERPL-MCNC: 0.57 MG/DL
EOSINOPHIL # BLD AUTO: 0.23 K/UL
EOSINOPHIL NFR BLD AUTO: 8 %
GLUCOSE SERPL-MCNC: 93 MG/DL
HCT VFR BLD CALC: 41 %
HGB BLD-MCNC: 13.3 G/DL
IMM GRANULOCYTES NFR BLD AUTO: 0.3 %
LYMPHOCYTES # BLD AUTO: 1.1 K/UL
LYMPHOCYTES NFR BLD AUTO: 38.2 %
MAN DIFF?: NORMAL
MCHC RBC-ENTMCNC: 28.9 PG
MCHC RBC-ENTMCNC: 32.4 GM/DL
MCV RBC AUTO: 89.1 FL
MONOCYTES # BLD AUTO: 0.28 K/UL
MONOCYTES NFR BLD AUTO: 9.7 %
NEUTROPHILS # BLD AUTO: 1.23 K/UL
NEUTROPHILS NFR BLD AUTO: 42.8 %
PLATELET # BLD AUTO: 323 K/UL
POTASSIUM SERPL-SCNC: 4.7 MMOL/L
PROT SERPL-MCNC: 7.8 G/DL
RBC # BLD: 4.6 M/UL
RBC # FLD: 14.5 %
SODIUM SERPL-SCNC: 139 MMOL/L
WBC # FLD AUTO: 2.88 K/UL

## 2018-11-12 ENCOUNTER — APPOINTMENT (OUTPATIENT)
Dept: RHEUMATOLOGY | Facility: CLINIC | Age: 28
End: 2018-11-12

## 2018-11-21 LAB
BASOPHILS # BLD AUTO: 0.01 K/UL
BASOPHILS NFR BLD AUTO: 0.3 %
EOSINOPHIL # BLD AUTO: 0.19 K/UL
EOSINOPHIL NFR BLD AUTO: 4.9 %
HCT VFR BLD CALC: 42.4 %
HGB BLD-MCNC: 13.9 G/DL
IMM GRANULOCYTES NFR BLD AUTO: 0 %
LYMPHOCYTES # BLD AUTO: 1.34 K/UL
LYMPHOCYTES NFR BLD AUTO: 34.5 %
MAN DIFF?: NORMAL
MCHC RBC-ENTMCNC: 30 PG
MCHC RBC-ENTMCNC: 32.8 GM/DL
MCV RBC AUTO: 91.6 FL
MONOCYTES # BLD AUTO: 0.2 K/UL
MONOCYTES NFR BLD AUTO: 5.2 %
NEUTROPHILS # BLD AUTO: 2.14 K/UL
NEUTROPHILS NFR BLD AUTO: 55.1 %
PLATELET # BLD AUTO: 310 K/UL
RBC # BLD: 4.63 M/UL
RBC # FLD: 14.3 %
WBC # FLD AUTO: 3.88 K/UL

## 2018-12-06 ENCOUNTER — LABORATORY RESULT (OUTPATIENT)
Age: 28
End: 2018-12-06

## 2018-12-06 ENCOUNTER — APPOINTMENT (OUTPATIENT)
Dept: RHEUMATOLOGY | Facility: CLINIC | Age: 28
End: 2018-12-06
Payer: MEDICAID

## 2018-12-06 ENCOUNTER — APPOINTMENT (OUTPATIENT)
Dept: DERMATOLOGY | Facility: CLINIC | Age: 28
End: 2018-12-06
Payer: MEDICAID

## 2018-12-06 VITALS
OXYGEN SATURATION: 95 % | HEART RATE: 91 BPM | HEIGHT: 62.8 IN | BODY MASS INDEX: 21.26 KG/M2 | DIASTOLIC BLOOD PRESSURE: 62 MMHG | WEIGHT: 120 LBS | SYSTOLIC BLOOD PRESSURE: 91 MMHG | TEMPERATURE: 98.5 F

## 2018-12-06 VITALS
DIASTOLIC BLOOD PRESSURE: 67 MMHG | RESPIRATION RATE: 16 BRPM | OXYGEN SATURATION: 98 % | SYSTOLIC BLOOD PRESSURE: 104 MMHG | WEIGHT: 122 LBS | HEIGHT: 62 IN | HEART RATE: 73 BPM | BODY MASS INDEX: 22.45 KG/M2 | TEMPERATURE: 98.2 F

## 2018-12-06 LAB
APPEARANCE: CLEAR
BILIRUBIN URINE: NEGATIVE
BLOOD URINE: NEGATIVE
C3 SERPL-MCNC: 105 MG/DL
C4 SERPL-MCNC: 22 MG/DL
CK SERPL-CCNC: 50 U/L
COLOR: ABNORMAL
CRP SERPL-MCNC: <0.1 MG/DL
ENA RNP AB SER IA-ACNC: 0.4 AL
ENA SM AB SER IA-ACNC: 0.3 AL
ERYTHROCYTE [SEDIMENTATION RATE] IN BLOOD BY WESTERGREN METHOD: 24 MM/HR
GLUCOSE QUALITATIVE U: NEGATIVE MG/DL
KETONES URINE: NEGATIVE
LEUKOCYTE ESTERASE URINE: NEGATIVE
NITRITE URINE: NEGATIVE
PH URINE: 5.5
PROTEIN URINE: NEGATIVE MG/DL
RHEUMATOID FACT SER QL: <10 IU/ML
SPECIFIC GRAVITY URINE: 1.03
UROBILINOGEN URINE: NEGATIVE MG/DL

## 2018-12-06 PROCEDURE — 99214 OFFICE O/P EST MOD 30 MIN: CPT | Mod: 25

## 2018-12-06 PROCEDURE — 17110 DESTRUCTION B9 LES UP TO 14: CPT

## 2018-12-06 PROCEDURE — 99213 OFFICE O/P EST LOW 20 MIN: CPT

## 2018-12-06 RX ORDER — TRIAMCINOLONE ACETONIDE 1 MG/G
0.1 CREAM TOPICAL
Qty: 1 | Refills: 1 | Status: ACTIVE | COMMUNITY
Start: 2018-12-06 | End: 1900-01-01

## 2018-12-10 LAB
ANA SER IF-ACNC: NEGATIVE
CCP AB SER IA-ACNC: <8 UNITS
DSDNA AB SER-ACNC: 16 IU/ML
RF+CCP IGG SER-IMP: NEGATIVE

## 2018-12-20 ENCOUNTER — TRANSCRIPTION ENCOUNTER (OUTPATIENT)
Age: 28
End: 2018-12-20

## 2019-01-04 ENCOUNTER — TRANSCRIPTION ENCOUNTER (OUTPATIENT)
Age: 29
End: 2019-01-04

## 2019-01-10 ENCOUNTER — APPOINTMENT (OUTPATIENT)
Dept: RHEUMATOLOGY | Facility: CLINIC | Age: 29
End: 2019-01-10
Payer: MEDICAID

## 2019-01-10 VITALS
SYSTOLIC BLOOD PRESSURE: 107 MMHG | HEIGHT: 62 IN | TEMPERATURE: 97.6 F | HEART RATE: 76 BPM | OXYGEN SATURATION: 99 % | BODY MASS INDEX: 22.26 KG/M2 | WEIGHT: 121 LBS | DIASTOLIC BLOOD PRESSURE: 70 MMHG | RESPIRATION RATE: 16 BRPM

## 2019-01-10 DIAGNOSIS — G89.29 PAIN IN UNSPECIFIED JOINT: ICD-10-CM

## 2019-01-10 DIAGNOSIS — M24.529 CONTRACTURE, UNSPECIFIED ELBOW: ICD-10-CM

## 2019-01-10 DIAGNOSIS — M25.50 PAIN IN UNSPECIFIED JOINT: ICD-10-CM

## 2019-01-10 LAB
ALBUMIN SERPL ELPH-MCNC: 4.7 G/DL
ALP BLD-CCNC: 44 U/L
ALT SERPL-CCNC: 19 U/L
ANION GAP SERPL CALC-SCNC: 11 MMOL/L
AST SERPL-CCNC: 19 U/L
BASOPHILS # BLD AUTO: 0.02 K/UL
BASOPHILS NFR BLD AUTO: 0.5 %
BILIRUB SERPL-MCNC: 0.7 MG/DL
BUN SERPL-MCNC: 11 MG/DL
CALCIUM SERPL-MCNC: 9.6 MG/DL
CHLORIDE SERPL-SCNC: 103 MMOL/L
CO2 SERPL-SCNC: 25 MMOL/L
CREAT SERPL-MCNC: 0.57 MG/DL
EOSINOPHIL # BLD AUTO: 0.18 K/UL
EOSINOPHIL NFR BLD AUTO: 4.7 %
GLUCOSE SERPL-MCNC: 82 MG/DL
HCT VFR BLD CALC: 43.5 %
HGB BLD-MCNC: 14.1 G/DL
IMM GRANULOCYTES NFR BLD AUTO: 0 %
LYMPHOCYTES # BLD AUTO: 1.13 K/UL
LYMPHOCYTES NFR BLD AUTO: 29.3 %
MAN DIFF?: NORMAL
MCHC RBC-ENTMCNC: 30.3 PG
MCHC RBC-ENTMCNC: 32.4 GM/DL
MCV RBC AUTO: 93.3 FL
MONOCYTES # BLD AUTO: 0.22 K/UL
MONOCYTES NFR BLD AUTO: 5.7 %
NEUTROPHILS # BLD AUTO: 2.31 K/UL
NEUTROPHILS NFR BLD AUTO: 59.8 %
PLATELET # BLD AUTO: 338 K/UL
POTASSIUM SERPL-SCNC: 4.9 MMOL/L
PROT SERPL-MCNC: 7.9 G/DL
RBC # BLD: 4.66 M/UL
RBC # FLD: 14.5 %
SODIUM SERPL-SCNC: 139 MMOL/L
WBC # FLD AUTO: 3.86 K/UL

## 2019-01-10 PROCEDURE — 99213 OFFICE O/P EST LOW 20 MIN: CPT

## 2019-01-11 PROBLEM — M25.50 CHRONIC JOINT PAIN: Status: ACTIVE | Noted: 2018-05-08

## 2019-01-11 NOTE — HISTORY OF PRESENT ILLNESS
[FreeTextEntry1] : Patient presents with mild symptoms of dry cough and congestion. She otherwise denies fevers or accompanied joint swelling. Blood testing reviewed with patient with normal serologies and inflammatory markers. Urinalysis without protein or blood.  She explains moderate immobility of the right elbow and felt at one point it had improved on weekly doses of methotrexate, however notes resurfacing of symptoms. She finds the R elbow is not extending fully.  Patient had requested mood stabilizers through portal and was recommended to obtain from mental health specialist for which she has upcoming appt with.  She denies low mood.  She otherwise denies new c/o of photosensitivity, oral ulcers, dyspnea, cp, motor/ sensory disturbances or fevers. nothing

## 2019-01-11 NOTE — PHYSICAL EXAM
[General Appearance - Alert] : alert [General Appearance - In No Acute Distress] : in no acute distress [Sclera] : the sclera and conjunctiva were normal [Examination Of The Oral Cavity] : the lips and gums were normal [Oropharynx] : the oropharynx was normal [Neck Appearance] : the appearance of the neck was normal [] : the neck was supple [Auscultation Breath Sounds / Voice Sounds] : lungs were clear to auscultation bilaterally [Heart Sounds] : normal S1 and S2 [Edema] : there was no peripheral edema [No Spinal Tenderness] : no spinal tenderness [Abnormal Walk] : normal gait [Nail Clubbing] : no clubbing  or cyanosis of the fingernails [Musculoskeletal - Swelling] : no joint swelling seen [Motor Tone] : muscle strength and tone were normal [Motor Exam] : the motor exam was normal [Oriented To Time, Place, And Person] : oriented to person, place, and time [Impaired Insight] : insight and judgment were intact [FreeTextEntry1] : plaques on forearms

## 2019-01-11 NOTE — REVIEW OF SYSTEMS
[Arthralgias] : arthralgias [Joint Stiffness] : joint stiffness [Skin Lesions] : skin lesion [Depression] : depression [Fever] : no fever [Chills] : no chills [Eye Pain] : no eye pain [Dry Eyes] : no dryness of the eyes [Sore Throat] : no sore throat [Hoarseness] : no hoarseness [Chest Pain] : no chest pain [Palpitations] : no palpitations [Lower Ext Edema] : no lower extremity edema [Shortness Of Breath] : no shortness of breath [SOB on Exertion] : no shortness of breath during exertion [Joint Swelling] : no joint swelling [Limb Weakness] : no limb weakness [Difficulty Walking] : no difficulty walking

## 2019-01-11 NOTE — ASSESSMENT
[FreeTextEntry1] : Patient with SLE with R elbow contracture:\par Patient to c/w 5 tablets of MTX;  Patient understands the increase risk of  bone marrow, liver and lung toxicity associated with MTX.  The patient understands the importance of monitoring for drug toxicities every three months.PT referral essential to help with joint mobilization and to prevent permanent contracture.   Ortho referral given for further assessment.  Patient may have overlap picture as she may have features of inflammatory arthritide.  Patient to c/w HCQ.  Discussed the benefits of anti-malarial therapy such as Hydroxychloroquine (HCQ) to help slow progression of disease, including renal involvement as well as as taming of APL antibodies, if present.   She understands the importance of sun avoidance and the application of SPF protection as well as the use of wide brim hats.\par She is in agreement with the above plan and will return in two months' time.\par \par \par

## 2019-01-14 ENCOUNTER — APPOINTMENT (OUTPATIENT)
Dept: ORTHOPEDIC SURGERY | Facility: CLINIC | Age: 29
End: 2019-01-14
Payer: MEDICAID

## 2019-01-14 VITALS
HEIGHT: 62 IN | SYSTOLIC BLOOD PRESSURE: 100 MMHG | DIASTOLIC BLOOD PRESSURE: 65 MMHG | WEIGHT: 121 LBS | BODY MASS INDEX: 22.26 KG/M2 | HEART RATE: 91 BPM

## 2019-01-14 DIAGNOSIS — Z86.59 PERSONAL HISTORY OF OTHER MENTAL AND BEHAVIORAL DISORDERS: ICD-10-CM

## 2019-01-14 PROCEDURE — 73080 X-RAY EXAM OF ELBOW: CPT | Mod: RT

## 2019-01-14 PROCEDURE — 99204 OFFICE O/P NEW MOD 45 MIN: CPT

## 2019-01-14 NOTE — HISTORY OF PRESENT ILLNESS
[de-identified] : CC R Elbow\par \par HPI 29 yo F RHD presents with acute onset of 4 mo pain in the R elbow [without an injury]. The pain is same, and rated a 5 out of 10, described as sharp, radiation to the. Rest makes the pain better and attempting to extend the elbow makes the pain worse. The patient reports associated symptoms of loss of range of motion. The patient - pain at night affecting sleep, - neck pain, and  similar pain previously[Text].\par \par The patient has tried the following treatments:\par Activity modification	+\par Ice			+\par Braces    		-\par Nsaids    		+\par Physical Therapy  	-\par Cortisone Injection	-\par Arthroscopy/Surgery	-\par \par Review of Systems is positive for the above musculoskeletal symptoms and is otherwise non-contributory for general, constitutional, psychiatric, neurologic, HEENT, cardiac, respiratory, gastrointestinal, reproductive, lymphatic, and dermatologic complaints.\par \par Consult by Dr Roxanne Caldwell

## 2019-01-14 NOTE — DISCUSSION/SUMMARY
[de-identified] : Right elbow flexion contracture\par \par I discussed the findings and history exam and radiology\par Given the patient's history of evanescent flexion contracture and concern about a radiolucent loose body within the elbow or the soft tissue contracture within the elbow\par \par Therefore I am ordering an MRI of the right elbow to assess for radiolucent loose bodies as well as examination the patient elbow further soft tissue abnormalities not seen on plain x-ray. Libra Rebollar\par \par The patient was prescribed Diclofenac PO non-steroidal anti-inflammatory medication. 50mg tablets twice daily to be taken for at least 1-2 weeks in a row and then PRN afterwards. Risks and benefits were discussed and include but not limited to renal damage and GI ulceration and bleeding.  They were advised to take with food to limit stomach upset as well as warned to stop the medication if worsening gastric pain or dizziness or other side effects. Also to immediately stop the medication and seek appropriate medical attention if any severe stomach ache, gastritis, black/red vomit, black/red stools or any other medical concern.\par \par \par Followup after MRI is complete

## 2019-01-14 NOTE — PHYSICAL EXAM
[de-identified] : Physical Examination\par General: well nourished, in no acute distress, alert and oriented to person, place and time\par Psychiatric: normal mood and affect, no abnormal movements or speech patterns\par Eyes: vision intact - glasses\par Throat: no thyromegaly\par Lymph: no enlarged nodes, no lymphedema in extremity\par Respiratory: no wheezing, no shortness of breath with ambulation\par Cardiac: no cardiac leg swelling, 2+ peripheral pulses\par Neurology: normal gross sensation in extremities to light touch\par Abdomen: soft, non-tender, tympanic, no masses\par \par Musculoskeletal Examination\par Cervical spine		Full painless range of motion and negative Spurling's test\par \par Elbow     			Right			Left\par Appearance\par      Skin 				normal			normal\par      Swelling/Deformity		normal			normal\par  Range of Motion\par      Flexion			160			160\par      Extension			20			0\par      Supination			85			85\par      Pronation			90			90\par Palpation\par      Radial Head			-			-\par      Lateral Epicondyle		-			-\par      Medial Epicondyle		-			-\par      Olecranon			-			-\par      Triceps Tendon		-			-\par      Biceps Tendon		-			-\par \par Strength Examination\par      Flexion 			5+ / 0			5+ / 0\par      Extension			5+ / 0			5+ / 0\par      Supination			5+ / 0			5+ / 0\par      Pronation			5+ / 0			5+ / 0\par      				normal			normal\par \par Special Examination\par      Milking Posterolateral		-			-\par      Chair Rise Posteromedial 	-			-\par      Ulnar Cubital Tunnel Tinnels	-			-\par      Sublux Ulnar Nerve		-			-\par      \par Sensation\par      Axillary			normal			normal\par      LatAntCubBrach 		normal			normal\par      Median 			normal			normal\par      Ulnar 			normal			normal\par      Radial 			normal			normal\par Motor\par      AIN 				normal			normal\par      Ulnar 			normal			normal\par      Radial 			normal			normal\par      PIN 				normal			normal\par Pulses\par      Radial			2+			2+\par  [de-identified] : 3 views of the right elbow (AP, lateral and radial head) were ordered, obtained and evaluated by myself today and demonstrate:\par The radial head aligns with the capitellum on all views\par no degenerative joint disease\par no fracture\par no dislocation \par Otherwise normal osseous bone structure and soft tissue contour\par No evidence of radial opaque loose body

## 2019-01-24 ENCOUNTER — APPOINTMENT (OUTPATIENT)
Dept: DERMATOLOGY | Facility: CLINIC | Age: 29
End: 2019-01-24
Payer: MEDICAID

## 2019-01-24 VITALS
WEIGHT: 120 LBS | TEMPERATURE: 98.6 F | HEART RATE: 83 BPM | SYSTOLIC BLOOD PRESSURE: 99 MMHG | DIASTOLIC BLOOD PRESSURE: 66 MMHG | OXYGEN SATURATION: 97 % | BODY MASS INDEX: 22.08 KG/M2 | HEIGHT: 62 IN

## 2019-01-24 PROCEDURE — 99214 OFFICE O/P EST MOD 30 MIN: CPT

## 2019-01-24 RX ORDER — FLUOCINONIDE 0.5 MG/ML
0.05 SOLUTION TOPICAL
Qty: 1 | Refills: 1 | Status: ACTIVE | COMMUNITY
Start: 2019-01-24 | End: 1900-01-01

## 2019-01-24 NOTE — PHYSICAL EXAM
[Alert] : alert [Oriented x 3] : ~L oriented x 3 [Well Nourished] : well nourished [Conjunctiva Non-injected] : conjunctiva non-injected [No Visual Lymphadenopathy] : no visual  lymphadenopathy [No Clubbing] : no clubbing [No Edema] : no edema [No Bromhidrosis] : no bromhidrosis [No Chromhidrosis] : no chromhidrosis [FreeTextEntry3] : trunk and ext clear\par hyperpigmented patches on back\par scalp clear today

## 2019-01-24 NOTE — HISTORY OF PRESENT ILLNESS
[FreeTextEntry1] : f/u atopic dermatitis [de-identified] : 28F here for above\par 1. atopic dermatitis: taking 10 mg weekly of MTX. Tolerating well with exception of GI upset on night of medication intake which she states resolves by morning time.\par Pt notes sig less itch, 1/10. Reports severe itch along band on scalp. No topicals tried. Denies fatigue. No other skin concerns. \par \par Pt with SLE, following with Rheum, on Plaquenil. Recently found to R elbow contracture. Also following with Ortho.

## 2019-02-06 ENCOUNTER — APPOINTMENT (OUTPATIENT)
Dept: ORTHOPEDIC SURGERY | Facility: CLINIC | Age: 29
End: 2019-02-06
Payer: MEDICAID

## 2019-02-06 PROCEDURE — 99213 OFFICE O/P EST LOW 20 MIN: CPT

## 2019-02-06 NOTE — HISTORY OF PRESENT ILLNESS
[de-identified] : CC R Elbow\par \par HPI 29 yo F RHD presents for MRI FU acute onset of 4 mo pain in the R elbow [without an injury]. The pain is same, and rated a 5 out of 10, described as sharp, radiation to the. Rest makes the pain better and attempting to extend the elbow makes the pain worse. The patient reports associated symptoms of loss of range of motion. The patient - pain at night affecting sleep, - neck pain, and  similar pain previously[Text].\par \par The patient has tried the following treatments:\par Activity modification	+\par Ice			+\par Braces    		-\par Nsaids    		+\par Physical Therapy  	-\par Cortisone Injection	-\par Arthroscopy/Surgery	-\par \par improved ROM spontaneously, occasional\par \par Review of Systems is positive for the above musculoskeletal symptoms and is otherwise non-contributory for general, constitutional, psychiatric, neurologic, HEENT, cardiac, respiratory, gastrointestinal, reproductive, lymphatic, and dermatologic complaints.\par \par Consult by Dr Roxanne Caldwell

## 2019-02-06 NOTE — PHYSICAL EXAM
[de-identified] : Physical Examination\par General: well nourished, in no acute distress, alert and oriented to person, place and time\par Psychiatric: normal mood and affect, no abnormal movements or speech patterns\par Eyes: vision intact - glasses\par Throat: no thyromegaly\par Lymph: no enlarged nodes, no lymphedema in extremity\par Respiratory: no wheezing, no shortness of breath with ambulation\par Cardiac: no cardiac leg swelling, 2+ peripheral pulses\par Neurology: normal gross sensation in extremities to light touch\par Abdomen: soft, non-tender, tympanic, no masses\par \par Musculoskeletal Examination\par Cervical spine		Full painless range of motion and negative Spurling's test\par \par Elbow     			Right			Left\par Appearance\par      Skin 				normal			normal\par      Swelling/Deformity		normal			normal\par  Range of Motion\par      Flexion			160			160\par      Extension			0			0\par      Supination			85			85\par      Pronation			90			90\par Palpation\par      Radial Head			-			-\par      Lateral Epicondyle		-			-\par      Medial Epicondyle		-			-\par      Olecranon			-			-\par      Triceps Tendon		-			-\par      Biceps Tendon		-			-\par \par Strength Examination\par      Flexion 			5+ / 0			5+ / 0\par      Extension			5+ / 0			5+ / 0\par      Supination			5+ / 0			5+ / 0\par      Pronation			5+ / 0			5+ / 0\par      				normal			normal\par \par Special Examination\par      Milking Posterolateral		-			-\par      Chair Rise Posteromedial 	-			-\par      Ulnar Cubital Tunnel Tinnels	-			-\par      Sublux Ulnar Nerve		-			-\par      \par Sensation\par      Axillary			normal			normal\par      LatAntCubBrach 		normal			normal\par      Median 			normal			normal\par      Ulnar 			normal			normal\par      Radial 			normal			normal\par Motor\par      AIN 				normal			normal\par      Ulnar 			normal			normal\par      Radial 			normal			normal\par      PIN 				normal			normal\par Pulses\par      Radial			2+			2+\par  [de-identified] : 3 views of the right elbow (AP, lateral and radial head)\par demonstrate:\par The radial head aligns with the capitellum on all views\par no degenerative joint disease\par no fracture\par no dislocation \par Otherwise normal osseous bone structure and soft tissue contour\par No evidence of radial opaque loose body\par \par mri right elbow ZPRAD 1-28-19\par \par Images do not show a loose body within the posterior or anterior shoulder\par Possible small band of tissue within the posterior lateral elbow possibly plica\par \par Formal read Impression \par normal MR examination vital

## 2019-02-06 NOTE — DISCUSSION/SUMMARY
[de-identified] : Right elbow flexion contracture result\par \par I discussed my findings and history exam and radiology the MRI images were reviewed with the patient\par \par I discussed the do not have a great understanding of exactly why her elbow has occasional locking preventing full extension as there is no radiolucent loose bodies within the olecranon fossa or posterior elbow causing locking. There is a possible band of scar tissue/plica within the posterior lateral tissues which may cause symptoms however given the patient's current level of symptoms and lack of symptomology I do not recommend any current management. The patient wishes to discuss him further if symptoms worsen can fu to discuss possible\par arthroscopy\par \par FU prn

## 2019-02-20 LAB
ALBUMIN SERPL ELPH-MCNC: 4.6 G/DL
ALP BLD-CCNC: 42 U/L
ALT SERPL-CCNC: 13 U/L
ANION GAP SERPL CALC-SCNC: 11 MMOL/L
AST SERPL-CCNC: 20 U/L
BASOPHILS # BLD AUTO: 0.02 K/UL
BASOPHILS NFR BLD AUTO: 0.5 %
BILIRUB SERPL-MCNC: 0.8 MG/DL
BUN SERPL-MCNC: 8 MG/DL
CALCIUM SERPL-MCNC: 9.5 MG/DL
CHLORIDE SERPL-SCNC: 104 MMOL/L
CO2 SERPL-SCNC: 25 MMOL/L
CREAT SERPL-MCNC: 0.63 MG/DL
EOSINOPHIL # BLD AUTO: 0.26 K/UL
EOSINOPHIL NFR BLD AUTO: 5.9 %
GLUCOSE SERPL-MCNC: 94 MG/DL
HCT VFR BLD CALC: 43.6 %
HGB BLD-MCNC: 13.9 G/DL
IMM GRANULOCYTES NFR BLD AUTO: 0.2 %
LYMPHOCYTES # BLD AUTO: 1.34 K/UL
LYMPHOCYTES NFR BLD AUTO: 30.5 %
MAN DIFF?: NORMAL
MCHC RBC-ENTMCNC: 30.8 PG
MCHC RBC-ENTMCNC: 31.9 GM/DL
MCV RBC AUTO: 96.5 FL
MONOCYTES # BLD AUTO: 0.31 K/UL
MONOCYTES NFR BLD AUTO: 7 %
NEUTROPHILS # BLD AUTO: 2.46 K/UL
NEUTROPHILS NFR BLD AUTO: 55.9 %
PLATELET # BLD AUTO: 259 K/UL
POTASSIUM SERPL-SCNC: 4.1 MMOL/L
PROT SERPL-MCNC: 7.9 G/DL
RBC # BLD: 4.52 M/UL
RBC # FLD: 14.2 %
SODIUM SERPL-SCNC: 140 MMOL/L
WBC # FLD AUTO: 4.4 K/UL

## 2019-02-28 ENCOUNTER — TRANSCRIPTION ENCOUNTER (OUTPATIENT)
Age: 29
End: 2019-02-28

## 2019-03-07 ENCOUNTER — APPOINTMENT (OUTPATIENT)
Dept: DERMATOLOGY | Facility: CLINIC | Age: 29
End: 2019-03-07
Payer: MEDICAID

## 2019-03-07 VITALS
OXYGEN SATURATION: 99 % | WEIGHT: 120 LBS | TEMPERATURE: 98.6 F | BODY MASS INDEX: 22.08 KG/M2 | DIASTOLIC BLOOD PRESSURE: 65 MMHG | SYSTOLIC BLOOD PRESSURE: 108 MMHG | HEIGHT: 62 IN | HEART RATE: 74 BPM

## 2019-03-07 PROCEDURE — 99214 OFFICE O/P EST MOD 30 MIN: CPT | Mod: GC

## 2019-04-04 ENCOUNTER — APPOINTMENT (OUTPATIENT)
Dept: RHEUMATOLOGY | Facility: CLINIC | Age: 29
End: 2019-04-04
Payer: MEDICAID

## 2019-04-04 VITALS
OXYGEN SATURATION: 98 % | SYSTOLIC BLOOD PRESSURE: 104 MMHG | DIASTOLIC BLOOD PRESSURE: 71 MMHG | TEMPERATURE: 98.2 F | WEIGHT: 122 LBS | BODY MASS INDEX: 22.45 KG/M2 | HEIGHT: 62 IN | RESPIRATION RATE: 16 BRPM | HEART RATE: 70 BPM

## 2019-04-04 DIAGNOSIS — F41.9 ANXIETY DISORDER, UNSPECIFIED: ICD-10-CM

## 2019-04-04 PROCEDURE — 99213 OFFICE O/P EST LOW 20 MIN: CPT

## 2019-04-04 NOTE — PHYSICAL EXAM
[General Appearance - Alert] : alert [General Appearance - In No Acute Distress] : in no acute distress [Sclera] : the sclera and conjunctiva were normal [Examination Of The Oral Cavity] : the lips and gums were normal [Oropharynx] : the oropharynx was normal [Neck Appearance] : the appearance of the neck was normal [] : the neck was supple [Auscultation Breath Sounds / Voice Sounds] : lungs were clear to auscultation bilaterally [Heart Sounds] : normal S1 and S2 [Edema] : there was no peripheral edema [No Spinal Tenderness] : no spinal tenderness [Abnormal Walk] : normal gait [Nail Clubbing] : no clubbing  or cyanosis of the fingernails [Musculoskeletal - Swelling] : no joint swelling seen [Motor Tone] : muscle strength and tone were normal [Motor Exam] : the motor exam was normal [Oriented To Time, Place, And Person] : oriented to person, place, and time [Impaired Insight] : insight and judgment were intact [FreeTextEntry1] : plaques on forearms ; eczematous patch over 3rd MCP b/l

## 2019-04-04 NOTE — ASSESSMENT
[FreeTextEntry1] : Patient with SLE with anxiety:\par Patient to c/w 5 tablets of MTX;  Patient understands the increase risk of  bone marrow, liver and lung toxicity associated with MTX.  The patient understands the importance of monitoring for drug toxicities every three months.  Patient to c/w HCQ.  Discussed the benefits of anti-malarial therapy such as Hydroxychloroquine (HCQ) to help slow progression of disease, including renal involvement as well as as taming of APL antibodies, if present.   She understands the importance of sun avoidance and the application of SPF protection as well as the use of wide brim hats.\par As for anxiety, recommend low-dose amitriptyline to assist in symptoms. Patient to discuss with his psychiatrist before initiating.  She is in agreement with the above plan and will return in three months' time.\par \par \par

## 2019-04-04 NOTE — HISTORY OF PRESENT ILLNESS
[FreeTextEntry1] : Patient reports improved arthralgias save right fourth digit. She does not note accompanied joint swelling or recent trauma.  She finds R elbow has regained mobility.   She's noticing eczematous skin over her third knuckles bilaterally. She reports rash has improved overall with Derm treatment; care appreciated. Patient has been compliant with 5 tablets of methotrexate weekly in addition to 3 tablets folic acid daily plus hydroxychloroquine on a daily basis. She reports initiating BuSpar for anxiety however notes continued heightened nerves especially in crowded places. She reports having used Atarax however it led to profound fatigue. Other than mood, patient reports current therapy is addressing physical symptoms. \par She otherwise denies new c/o of photosensitivity, oral ulcers, dyspnea, cp, motor/ sensory disturbances or fevers.

## 2019-04-10 ENCOUNTER — TRANSCRIPTION ENCOUNTER (OUTPATIENT)
Age: 29
End: 2019-04-10

## 2019-05-09 ENCOUNTER — RX RENEWAL (OUTPATIENT)
Age: 29
End: 2019-05-09

## 2019-05-09 ENCOUNTER — APPOINTMENT (OUTPATIENT)
Dept: DERMATOLOGY | Facility: CLINIC | Age: 29
End: 2019-05-09
Payer: MEDICAID

## 2019-05-09 VITALS
HEIGHT: 62 IN | TEMPERATURE: 97.9 F | SYSTOLIC BLOOD PRESSURE: 104 MMHG | BODY MASS INDEX: 22.08 KG/M2 | DIASTOLIC BLOOD PRESSURE: 74 MMHG | HEART RATE: 80 BPM | WEIGHT: 120 LBS

## 2019-05-09 DIAGNOSIS — B07.9 VIRAL WART, UNSPECIFIED: ICD-10-CM

## 2019-05-09 LAB
ALBUMIN SERPL ELPH-MCNC: 4.4 G/DL
ALP BLD-CCNC: 47 U/L
ALT SERPL-CCNC: 14 U/L
ANION GAP SERPL CALC-SCNC: 10 MMOL/L
AST SERPL-CCNC: 17 U/L
BASOPHILS # BLD AUTO: 0.04 K/UL
BASOPHILS NFR BLD AUTO: 1.1 %
BILIRUB SERPL-MCNC: 0.6 MG/DL
BUN SERPL-MCNC: 11 MG/DL
CALCIUM SERPL-MCNC: 9.4 MG/DL
CHLORIDE SERPL-SCNC: 103 MMOL/L
CO2 SERPL-SCNC: 28 MMOL/L
CREAT SERPL-MCNC: 0.62 MG/DL
EOSINOPHIL # BLD AUTO: 0.15 K/UL
EOSINOPHIL NFR BLD AUTO: 4.2 %
GLUCOSE SERPL-MCNC: 75 MG/DL
HCT VFR BLD CALC: 42.7 %
HGB BLD-MCNC: 13.7 G/DL
IMM GRANULOCYTES NFR BLD AUTO: 0 %
LYMPHOCYTES # BLD AUTO: 1.26 K/UL
LYMPHOCYTES NFR BLD AUTO: 34.9 %
MAN DIFF?: NORMAL
MCHC RBC-ENTMCNC: 31 PG
MCHC RBC-ENTMCNC: 32.1 GM/DL
MCV RBC AUTO: 96.6 FL
MONOCYTES # BLD AUTO: 0.39 K/UL
MONOCYTES NFR BLD AUTO: 10.8 %
NEUTROPHILS # BLD AUTO: 1.77 K/UL
NEUTROPHILS NFR BLD AUTO: 49 %
PLATELET # BLD AUTO: 282 K/UL
POTASSIUM SERPL-SCNC: 4.2 MMOL/L
PROT SERPL-MCNC: 7.5 G/DL
RBC # BLD: 4.42 M/UL
RBC # FLD: 13.3 %
SODIUM SERPL-SCNC: 141 MMOL/L
WBC # FLD AUTO: 3.61 K/UL

## 2019-05-09 PROCEDURE — 99214 OFFICE O/P EST MOD 30 MIN: CPT

## 2019-06-03 ENCOUNTER — RX RENEWAL (OUTPATIENT)
Age: 29
End: 2019-06-03

## 2019-06-16 ENCOUNTER — RX RENEWAL (OUTPATIENT)
Age: 29
End: 2019-06-16

## 2019-07-28 ENCOUNTER — RX RENEWAL (OUTPATIENT)
Age: 29
End: 2019-07-28

## 2019-07-28 RX ORDER — METHOTREXATE 2.5 MG/1
2.5 TABLET ORAL
Qty: 30 | Refills: 1 | Status: ACTIVE | COMMUNITY
Start: 2018-10-18 | End: 1900-01-01

## 2019-07-29 ENCOUNTER — TRANSCRIPTION ENCOUNTER (OUTPATIENT)
Age: 29
End: 2019-07-29

## 2019-08-08 ENCOUNTER — LABORATORY RESULT (OUTPATIENT)
Age: 29
End: 2019-08-08

## 2019-08-08 ENCOUNTER — APPOINTMENT (OUTPATIENT)
Dept: RHEUMATOLOGY | Facility: CLINIC | Age: 29
End: 2019-08-08
Payer: MEDICAID

## 2019-08-08 VITALS
RESPIRATION RATE: 16 BRPM | WEIGHT: 119 LBS | HEART RATE: 63 BPM | BODY MASS INDEX: 21.9 KG/M2 | HEIGHT: 62 IN | OXYGEN SATURATION: 98 % | DIASTOLIC BLOOD PRESSURE: 73 MMHG | TEMPERATURE: 97.9 F | SYSTOLIC BLOOD PRESSURE: 109 MMHG

## 2019-08-08 DIAGNOSIS — S13.9XXA SPRAIN OF JOINTS AND LIGAMENTS OF UNSPECIFIED PARTS OF NECK, INITIAL ENCOUNTER: ICD-10-CM

## 2019-08-08 PROCEDURE — 99213 OFFICE O/P EST LOW 20 MIN: CPT

## 2019-08-08 RX ORDER — LIDOCAINE AND TETRACAINE 70; 70 MG/G; MG/G
7-7 CREAM TOPICAL
Qty: 1 | Refills: 3 | Status: ACTIVE | COMMUNITY
Start: 2019-08-08 | End: 1900-01-01

## 2019-08-08 NOTE — REVIEW OF SYSTEMS
[Arthralgias] : arthralgias [Skin Lesions] : skin lesion [Joint Stiffness] : joint stiffness [Depression] : depression [Fever] : no fever [Chills] : no chills [Eye Pain] : no eye pain [Dry Eyes] : no dryness of the eyes [Sore Throat] : no sore throat [Hoarseness] : no hoarseness [Chest Pain] : no chest pain [Lower Ext Edema] : no lower extremity edema [Palpitations] : no palpitations [SOB on Exertion] : no shortness of breath during exertion [Shortness Of Breath] : no shortness of breath [Joint Swelling] : no joint swelling [Limb Weakness] : no limb weakness [Difficulty Walking] : no difficulty walking

## 2019-08-08 NOTE — HISTORY OF PRESENT ILLNESS
[FreeTextEntry1] : Patient reports improvement in hand arthralgias ;  mild leukopenia noted on last blood draw in May otherwise patient tolerating therapy and continues with FA daily.   She does explain a sensation of shoulders and neck stiffness with accompanied tingling over the last one week which she attributes to alcohol intake of sake which she normally does not intake. She notes accompanied migraine.  she also explains erratic working wounds and getting to bed at 3am over the last three months. She finds however sleep has been restorative.   \par She explains eczematous skin has improved as well.  \par She otherwise denies new c/o of photosensitivity, oral ulcers, dyspnea, cp, motor/ sensory disturbances or fevers.

## 2019-08-08 NOTE — PHYSICAL EXAM
[General Appearance - Alert] : alert [General Appearance - In No Acute Distress] : in no acute distress [Sclera] : the sclera and conjunctiva were normal [Examination Of The Oral Cavity] : the lips and gums were normal [Oropharynx] : the oropharynx was normal [] : the neck was supple [Neck Appearance] : the appearance of the neck was normal [Auscultation Breath Sounds / Voice Sounds] : lungs were clear to auscultation bilaterally [Heart Sounds] : normal S1 and S2 [Edema] : there was no peripheral edema [No Spinal Tenderness] : no spinal tenderness [Abnormal Walk] : normal gait [Nail Clubbing] : no clubbing  or cyanosis of the fingernails [Musculoskeletal - Swelling] : no joint swelling seen [Motor Tone] : muscle strength and tone were normal [Skin Color & Pigmentation] : normal skin color and pigmentation [Sensation] : the sensory exam was normal to light touch and pinprick [Motor Exam] : the motor exam was normal [Oriented To Time, Place, And Person] : oriented to person, place, and time [Impaired Insight] : insight and judgment were intact [FreeTextEntry1] : Mild restriction of lateral neck flexion and rotation  with tightness of the trapezius

## 2019-08-08 NOTE — ASSESSMENT
[FreeTextEntry1] : Patient with SLE, neck strain\par Would like to repeat blood testing values today and assess leukopenia further. If with normal inflammatory markers, would decrease MTX dosing by one tablet.  Inflammatory markers obtained to assess for active disease; motor  and sensory is equal and reactive.  C spine film requested.  Patient to c/w HCQ.   She understands the importance of sun avoidance and the application of SPF protection as well as the use of wide brim hats. \par She is in agreement with the above plan and will return in three months' time.\par \par \par

## 2019-08-09 LAB
25(OH)D3 SERPL-MCNC: 38.4 NG/ML
ALBUMIN SERPL ELPH-MCNC: 4.4 G/DL
ALP BLD-CCNC: 35 U/L
ALT SERPL-CCNC: 17 U/L
ANION GAP SERPL CALC-SCNC: 11 MMOL/L
APPEARANCE: CLEAR
AST SERPL-CCNC: 17 U/L
BASOPHILS # BLD AUTO: 0.05 K/UL
BASOPHILS NFR BLD AUTO: 1.1 %
BILIRUB SERPL-MCNC: 0.9 MG/DL
BILIRUBIN URINE: NEGATIVE
BLOOD URINE: NEGATIVE
BUN SERPL-MCNC: 9 MG/DL
CALCIUM SERPL-MCNC: 9.1 MG/DL
CHLORIDE SERPL-SCNC: 102 MMOL/L
CO2 SERPL-SCNC: 27 MMOL/L
COLOR: YELLOW
CREAT SERPL-MCNC: 0.67 MG/DL
CRP SERPL-MCNC: 0.1 MG/DL
EOSINOPHIL # BLD AUTO: 0.33 K/UL
EOSINOPHIL NFR BLD AUTO: 7 %
ERYTHROCYTE [SEDIMENTATION RATE] IN BLOOD BY WESTERGREN METHOD: 4 MM/HR
GLUCOSE QUALITATIVE U: NEGATIVE
GLUCOSE SERPL-MCNC: 97 MG/DL
HCT VFR BLD CALC: 44 %
HGB BLD-MCNC: 14 G/DL
IMM GRANULOCYTES NFR BLD AUTO: 0.2 %
KETONES URINE: NEGATIVE
LEUKOCYTE ESTERASE URINE: NEGATIVE
LYMPHOCYTES # BLD AUTO: 0.92 K/UL
LYMPHOCYTES NFR BLD AUTO: 19.5 %
MAN DIFF?: NORMAL
MCHC RBC-ENTMCNC: 31.8 GM/DL
MCHC RBC-ENTMCNC: 31.9 PG
MCV RBC AUTO: 100.2 FL
MONOCYTES # BLD AUTO: 0.26 K/UL
MONOCYTES NFR BLD AUTO: 5.5 %
NEUTROPHILS # BLD AUTO: 3.16 K/UL
NEUTROPHILS NFR BLD AUTO: 66.7 %
NITRITE URINE: NEGATIVE
PH URINE: 7.5
PLATELET # BLD AUTO: 255 K/UL
POTASSIUM SERPL-SCNC: 4.1 MMOL/L
PROT SERPL-MCNC: 7.1 G/DL
PROTEIN URINE: NORMAL
RBC # BLD: 4.39 M/UL
RBC # FLD: 14.3 %
SODIUM SERPL-SCNC: 140 MMOL/L
SPECIFIC GRAVITY URINE: 1.02
TSH SERPL-ACNC: 3.07 UIU/ML
UROBILINOGEN URINE: NORMAL
WBC # FLD AUTO: 4.73 K/UL

## 2019-08-15 ENCOUNTER — APPOINTMENT (OUTPATIENT)
Dept: DERMATOLOGY | Facility: CLINIC | Age: 29
End: 2019-08-15
Payer: MEDICAID

## 2019-08-15 VITALS
SYSTOLIC BLOOD PRESSURE: 95 MMHG | HEIGHT: 62 IN | DIASTOLIC BLOOD PRESSURE: 63 MMHG | HEART RATE: 69 BPM | BODY MASS INDEX: 21.9 KG/M2 | WEIGHT: 119 LBS

## 2019-08-15 DIAGNOSIS — Z79.899 OTHER LONG TERM (CURRENT) DRUG THERAPY: ICD-10-CM

## 2019-08-15 PROCEDURE — 99214 OFFICE O/P EST MOD 30 MIN: CPT

## 2019-10-17 ENCOUNTER — APPOINTMENT (OUTPATIENT)
Dept: DERMATOLOGY | Facility: CLINIC | Age: 29
End: 2019-10-17

## 2019-10-21 ENCOUNTER — TRANSCRIPTION ENCOUNTER (OUTPATIENT)
Age: 29
End: 2019-10-21

## 2019-10-21 ENCOUNTER — RX RENEWAL (OUTPATIENT)
Age: 29
End: 2019-10-21

## 2019-10-31 ENCOUNTER — TRANSCRIPTION ENCOUNTER (OUTPATIENT)
Age: 29
End: 2019-10-31

## 2019-10-31 ENCOUNTER — RX RENEWAL (OUTPATIENT)
Age: 29
End: 2019-10-31

## 2019-11-20 ENCOUNTER — APPOINTMENT (OUTPATIENT)
Dept: RHEUMATOLOGY | Facility: CLINIC | Age: 29
End: 2019-11-20
Payer: COMMERCIAL

## 2019-11-20 VITALS
DIASTOLIC BLOOD PRESSURE: 67 MMHG | OXYGEN SATURATION: 98 % | BODY MASS INDEX: 21.16 KG/M2 | WEIGHT: 115 LBS | HEIGHT: 62 IN | RESPIRATION RATE: 16 BRPM | SYSTOLIC BLOOD PRESSURE: 111 MMHG | HEART RATE: 67 BPM | TEMPERATURE: 98.3 F

## 2019-11-20 DIAGNOSIS — M24.521 CONTRACTURE, RIGHT ELBOW: ICD-10-CM

## 2019-11-20 DIAGNOSIS — K52.9 NONINFECTIVE GASTROENTERITIS AND COLITIS, UNSPECIFIED: ICD-10-CM

## 2019-11-20 PROCEDURE — 99214 OFFICE O/P EST MOD 30 MIN: CPT

## 2019-11-21 LAB
25(OH)D3 SERPL-MCNC: 39.7 NG/ML
ALBUMIN SERPL ELPH-MCNC: 4.8 G/DL
ALP BLD-CCNC: 36 U/L
ALT SERPL-CCNC: 13 U/L
ANION GAP SERPL CALC-SCNC: 12 MMOL/L
AST SERPL-CCNC: 17 U/L
BASOPHILS # BLD AUTO: 0.04 K/UL
BASOPHILS NFR BLD AUTO: 0.9 %
BILIRUB SERPL-MCNC: 0.8 MG/DL
BUN SERPL-MCNC: 11 MG/DL
CALCIUM SERPL-MCNC: 9.6 MG/DL
CHLORIDE SERPL-SCNC: 103 MMOL/L
CO2 SERPL-SCNC: 23 MMOL/L
CREAT SERPL-MCNC: 0.64 MG/DL
CRP SERPL-MCNC: <0.1 MG/DL
EOSINOPHIL # BLD AUTO: 0.13 K/UL
EOSINOPHIL NFR BLD AUTO: 3.1 %
ERYTHROCYTE [SEDIMENTATION RATE] IN BLOOD BY WESTERGREN METHOD: 4 MM/HR
GLUCOSE SERPL-MCNC: 91 MG/DL
HCT VFR BLD CALC: 43.9 %
HGB BLD-MCNC: 14.5 G/DL
IMM GRANULOCYTES NFR BLD AUTO: 0.2 %
LYMPHOCYTES # BLD AUTO: 1.39 K/UL
LYMPHOCYTES NFR BLD AUTO: 32.9 %
MAN DIFF?: NORMAL
MCHC RBC-ENTMCNC: 31.9 PG
MCHC RBC-ENTMCNC: 33 GM/DL
MCV RBC AUTO: 96.7 FL
MONOCYTES # BLD AUTO: 0.37 K/UL
MONOCYTES NFR BLD AUTO: 8.8 %
NEUTROPHILS # BLD AUTO: 2.28 K/UL
NEUTROPHILS NFR BLD AUTO: 54.1 %
PLATELET # BLD AUTO: 289 K/UL
POTASSIUM SERPL-SCNC: 4.2 MMOL/L
PROT SERPL-MCNC: 7.6 G/DL
RBC # BLD: 4.54 M/UL
RBC # FLD: 13.2 %
SODIUM SERPL-SCNC: 138 MMOL/L
TSH SERPL-ACNC: 3.35 UIU/ML
WBC # FLD AUTO: 4.22 K/UL

## 2019-11-21 NOTE — PHYSICAL EXAM
[General Appearance - Alert] : alert [Sclera] : the sclera and conjunctiva were normal [General Appearance - In No Acute Distress] : in no acute distress [Examination Of The Oral Cavity] : the lips and gums were normal [Oropharynx] : the oropharynx was normal [Neck Appearance] : the appearance of the neck was normal [] : the neck was supple [Auscultation Breath Sounds / Voice Sounds] : lungs were clear to auscultation bilaterally [Heart Sounds] : normal S1 and S2 [No Spinal Tenderness] : no spinal tenderness [Edema] : there was no peripheral edema [Musculoskeletal - Swelling] : no joint swelling seen [Nail Clubbing] : no clubbing  or cyanosis of the fingernails [Abnormal Walk] : normal gait [Motor Tone] : muscle strength and tone were normal [Sensation] : the sensory exam was normal to light touch and pinprick [Motor Exam] : the motor exam was normal [Oriented To Time, Place, And Person] : oriented to person, place, and time [Impaired Insight] : insight and judgment were intact [FreeTextEntry1] : coin size hyperpigmented patch over the base of the neck

## 2019-11-21 NOTE — ASSESSMENT
[FreeTextEntry1] : Patient with SLE; loose stools:\par \par Patient to c/w HCQ and understands the increased risk of retinal toxicity and the importance of visual fields screening testing on a yearly basis. MTX to be continued at 4 tablets weekly to address arthritis as well as the eczema which she has recently flared.   Drug surveillance labs requested for today.   She understands the importance of sun avoidance and the application of SPF protection as well as the use of wide brim hats. \par As for the loose stools, we discussed dietary modifications as well as decreasing caffeine intake. We discussed diagnoses including irritable bowel syndrome, inflammatory bowel syndrome as well as infectious etiologies; GI referral given. Patient encouraged to hydrate and to perform stress reducing techniques.\par \par She is in agreement with the above plan and will return in three months' time.\par \par \par

## 2019-11-21 NOTE — HISTORY OF PRESENT ILLNESS
[FreeTextEntry1] : Patient reports has stiffness to be a day. She explains taking HCQ on a daily basis without complication.  She finds the right elbow can straighten intermittently depending on activity and otherwise denies accompanied joint swelling. She does report increasing loose stools over the last 5 weeks. She explains soft to liquid-like stools at least 3-4 times a day and does not report increased caffeine intake. She does note increased red meat intake and fine very faint blood on tissue paper which she thinks may be due to hemorrhoids. She denies fevers or chills, recent travel or exotic food intake. She reports continuing work at [employer] working from 2 PM to 1 AM in the morning which can lead to increased stress situations however findings the job overall makes her content.  She does note increased eczema eruption at the back of her neck since tapering methotrexate to 3 tablets weekly.\par She otherwise denies new c/o of photosensitivity, oral ulcers, dyspnea, cp, motor/ sensory disturbances or fevers.

## 2020-01-03 ENCOUNTER — RX RENEWAL (OUTPATIENT)
Age: 30
End: 2020-01-03

## 2020-01-03 ENCOUNTER — TRANSCRIPTION ENCOUNTER (OUTPATIENT)
Age: 30
End: 2020-01-03

## 2020-01-03 DIAGNOSIS — F32.9 ANXIETY DISORDER, UNSPECIFIED: ICD-10-CM

## 2020-01-03 DIAGNOSIS — F41.9 ANXIETY DISORDER, UNSPECIFIED: ICD-10-CM

## 2020-01-06 ENCOUNTER — RX CHANGE (OUTPATIENT)
Age: 30
End: 2020-01-06

## 2020-01-06 ENCOUNTER — TRANSCRIPTION ENCOUNTER (OUTPATIENT)
Age: 30
End: 2020-01-06

## 2020-01-06 RX ORDER — ESCITALOPRAM OXALATE 20 MG/1
20 TABLET ORAL
Qty: 30 | Refills: 0 | Status: ACTIVE | COMMUNITY
Start: 2018-05-17 | End: 1900-01-01

## 2020-03-03 ENCOUNTER — APPOINTMENT (OUTPATIENT)
Dept: RHEUMATOLOGY | Facility: CLINIC | Age: 30
End: 2020-03-03

## 2020-03-20 ENCOUNTER — TRANSCRIPTION ENCOUNTER (OUTPATIENT)
Age: 30
End: 2020-03-20

## 2020-03-30 ENCOUNTER — TRANSCRIPTION ENCOUNTER (OUTPATIENT)
Age: 30
End: 2020-03-30

## 2020-04-22 ENCOUNTER — APPOINTMENT (OUTPATIENT)
Dept: RHEUMATOLOGY | Facility: CLINIC | Age: 30
End: 2020-04-22
Payer: MEDICAID

## 2020-04-22 PROCEDURE — 99213 OFFICE O/P EST LOW 20 MIN: CPT | Mod: 95

## 2020-04-22 NOTE — HISTORY OF PRESENT ILLNESS
[Home] : at home, [unfilled] , at the time of the visit. [Other Location: e.g. Home (Enter Location, City,State)___] : at [unfilled] [Patient] : the patient [Self] : self [FreeTextEntry1] : Patient requesting telehealth visit in lieu of physical visit secondary to COVID-19  pandemic.\par \par Patient reports she is doing well overall improvement in range of motion of the upper extremities especially the right elbow. She finds she's been taking methotrexate regularly without complication and denies oral ulcers or recent rash.  She continues to perform ADLs without difficulty and has not noted swelling of the joints. She is independently obtaining groceries and practicing social distancing with facial covering along with proper hand washing. She does report some mild anxiety related to current pandemic however she is able to sleep and continues on psychiatric medications without complication.  She otherwise denies worsening skin rash, Raynaud's, shortness of breath, chest pain, motor or sensory disturbances or fevers.\par

## 2020-04-22 NOTE — PHYSICAL EXAM
[General Appearance - Alert] : alert [General Appearance - In No Acute Distress] : in no acute distress [] : no respiratory distress [Musculoskeletal - Swelling] : no joint swelling seen [Oriented To Time, Place, And Person] : oriented to person, place, and time [Impaired Insight] : insight and judgment were intact [FreeTextEntry1] : assessed by a/v platform

## 2020-04-22 NOTE — ASSESSMENT
[FreeTextEntry1] : Patient with lupus currently stable:\par \par Patient to c/w HCQ and understands the increased risk of retinal toxicity and the importance of visual fields screening testing on a yearly basis. MTX to be continued at 4 tablets weekly to address arthritis as well as the eczema of the skin.  Drug surveillance labs requested for today. She understands the importance of sun avoidance and the application of SPF protection as well as the use of wide brim hats. \par \par Patient continues to have psychotherapy via phone. Patient to continue to practice relaxation and stress-reducing techniques as well as exercise modification through yoga.  She is in agreement with the above plan and will return in two months' time.\par \par

## 2020-04-22 NOTE — REVIEW OF SYSTEMS
[Arthralgias] : arthralgias [Joint Stiffness] : joint stiffness [Depression] : depression [Fever] : no fever [Chest Pain] : no chest pain [Red Eyes] : eyes not red [Palpitations] : no palpitations [Lower Ext Edema] : no lower extremity edema [Shortness Of Breath] : no shortness of breath [Cough] : no cough [Joint Swelling] : no joint swelling [Difficulty Walking] : no difficulty walking [Feelings Of Weakness] : no feelings of weakness [Easy Bleeding] : no tendency for easy bleeding [Easy Bruising] : no tendency for easy bruising

## 2020-06-06 ENCOUNTER — TRANSCRIPTION ENCOUNTER (OUTPATIENT)
Age: 30
End: 2020-06-06

## 2020-09-14 ENCOUNTER — TRANSCRIPTION ENCOUNTER (OUTPATIENT)
Age: 30
End: 2020-09-14

## 2020-10-07 ENCOUNTER — TRANSCRIPTION ENCOUNTER (OUTPATIENT)
Age: 30
End: 2020-10-07

## 2020-10-07 LAB
ESTIMATED AVERAGE GLUCOSE: 105 MG/DL
HBA1C MFR BLD HPLC: 5.3 %

## 2020-10-08 LAB
SARS-COV-2 IGG SERPL IA-ACNC: <0.1 INDEX
SARS-COV-2 IGG SERPL QL IA: NEGATIVE

## 2020-10-09 ENCOUNTER — TRANSCRIPTION ENCOUNTER (OUTPATIENT)
Age: 30
End: 2020-10-09

## 2020-10-12 ENCOUNTER — TRANSCRIPTION ENCOUNTER (OUTPATIENT)
Age: 30
End: 2020-10-12

## 2020-10-13 ENCOUNTER — LABORATORY RESULT (OUTPATIENT)
Age: 30
End: 2020-10-13

## 2020-10-13 ENCOUNTER — TRANSCRIPTION ENCOUNTER (OUTPATIENT)
Age: 30
End: 2020-10-13

## 2020-10-14 ENCOUNTER — TRANSCRIPTION ENCOUNTER (OUTPATIENT)
Age: 30
End: 2020-10-14

## 2021-01-04 ENCOUNTER — TRANSCRIPTION ENCOUNTER (OUTPATIENT)
Age: 31
End: 2021-01-04

## 2021-03-05 ENCOUNTER — TRANSCRIPTION ENCOUNTER (OUTPATIENT)
Age: 31
End: 2021-03-05

## 2021-03-12 ENCOUNTER — NON-APPOINTMENT (OUTPATIENT)
Age: 31
End: 2021-03-12

## 2021-03-12 ENCOUNTER — TRANSCRIPTION ENCOUNTER (OUTPATIENT)
Age: 31
End: 2021-03-12

## 2021-03-15 ENCOUNTER — TRANSCRIPTION ENCOUNTER (OUTPATIENT)
Age: 31
End: 2021-03-15

## 2021-04-06 ENCOUNTER — TRANSCRIPTION ENCOUNTER (OUTPATIENT)
Age: 31
End: 2021-04-06

## 2021-05-25 ENCOUNTER — LABORATORY RESULT (OUTPATIENT)
Age: 31
End: 2021-05-25

## 2021-05-26 ENCOUNTER — TRANSCRIPTION ENCOUNTER (OUTPATIENT)
Age: 31
End: 2021-05-26

## 2021-06-01 ENCOUNTER — TRANSCRIPTION ENCOUNTER (OUTPATIENT)
Age: 31
End: 2021-06-01

## 2021-06-18 ENCOUNTER — EMERGENCY (EMERGENCY)
Facility: HOSPITAL | Age: 31
LOS: 1 days | Discharge: HOME | End: 2021-06-18
Attending: EMERGENCY MEDICINE
Payer: MEDICAID

## 2021-06-18 VITALS
HEART RATE: 94 BPM | RESPIRATION RATE: 16 BRPM | DIASTOLIC BLOOD PRESSURE: 73 MMHG | WEIGHT: 115.08 LBS | TEMPERATURE: 97 F | OXYGEN SATURATION: 99 % | HEIGHT: 62 IN | SYSTOLIC BLOOD PRESSURE: 118 MMHG

## 2021-06-18 VITALS
DIASTOLIC BLOOD PRESSURE: 57 MMHG | HEART RATE: 65 BPM | OXYGEN SATURATION: 98 % | SYSTOLIC BLOOD PRESSURE: 105 MMHG | RESPIRATION RATE: 16 BRPM | TEMPERATURE: 98 F

## 2021-06-18 LAB
ALBUMIN SERPL ELPH-MCNC: 4.4 G/DL — SIGNIFICANT CHANGE UP (ref 3.5–5)
ALP SERPL-CCNC: 32 U/L — LOW (ref 40–120)
ALT FLD-CCNC: 21 U/L DA — SIGNIFICANT CHANGE UP (ref 10–60)
AMPHET UR-MCNC: NEGATIVE — SIGNIFICANT CHANGE UP
ANION GAP SERPL CALC-SCNC: 11 MMOL/L — SIGNIFICANT CHANGE UP (ref 5–17)
AST SERPL-CCNC: 16 U/L — SIGNIFICANT CHANGE UP (ref 10–40)
BARBITURATES UR SCN-MCNC: NEGATIVE — SIGNIFICANT CHANGE UP
BASOPHILS # BLD AUTO: 0.02 K/UL — SIGNIFICANT CHANGE UP (ref 0–0.2)
BASOPHILS NFR BLD AUTO: 0.4 % — SIGNIFICANT CHANGE UP (ref 0–2)
BENZODIAZ UR-MCNC: NEGATIVE — SIGNIFICANT CHANGE UP
BILIRUB SERPL-MCNC: 2.2 MG/DL — HIGH (ref 0.2–1.2)
BUN SERPL-MCNC: 14 MG/DL — SIGNIFICANT CHANGE UP (ref 7–18)
CALCIUM SERPL-MCNC: 8.7 MG/DL — SIGNIFICANT CHANGE UP (ref 8.4–10.5)
CHLORIDE SERPL-SCNC: 103 MMOL/L — SIGNIFICANT CHANGE UP (ref 96–108)
CK SERPL-CCNC: 40 U/L — SIGNIFICANT CHANGE UP (ref 21–215)
CO2 SERPL-SCNC: 22 MMOL/L — SIGNIFICANT CHANGE UP (ref 22–31)
COCAINE METAB.OTHER UR-MCNC: NEGATIVE — SIGNIFICANT CHANGE UP
CREAT SERPL-MCNC: 0.54 MG/DL — SIGNIFICANT CHANGE UP (ref 0.5–1.3)
EOSINOPHIL # BLD AUTO: 0.01 K/UL — SIGNIFICANT CHANGE UP (ref 0–0.5)
EOSINOPHIL NFR BLD AUTO: 0.2 % — SIGNIFICANT CHANGE UP (ref 0–6)
GLUCOSE SERPL-MCNC: 72 MG/DL — SIGNIFICANT CHANGE UP (ref 70–99)
HCG SERPL-ACNC: <1 MIU/ML — SIGNIFICANT CHANGE UP
HCT VFR BLD CALC: 42.3 % — SIGNIFICANT CHANGE UP (ref 34.5–45)
HGB BLD-MCNC: 14.4 G/DL — SIGNIFICANT CHANGE UP (ref 11.5–15.5)
IMM GRANULOCYTES NFR BLD AUTO: 0.4 % — SIGNIFICANT CHANGE UP (ref 0–1.5)
LYMPHOCYTES # BLD AUTO: 1.05 K/UL — SIGNIFICANT CHANGE UP (ref 1–3.3)
LYMPHOCYTES # BLD AUTO: 20.2 % — SIGNIFICANT CHANGE UP (ref 13–44)
MAGNESIUM SERPL-MCNC: 2 MG/DL — SIGNIFICANT CHANGE UP (ref 1.6–2.6)
MCHC RBC-ENTMCNC: 32.7 PG — SIGNIFICANT CHANGE UP (ref 27–34)
MCHC RBC-ENTMCNC: 34 GM/DL — SIGNIFICANT CHANGE UP (ref 32–36)
MCV RBC AUTO: 95.9 FL — SIGNIFICANT CHANGE UP (ref 80–100)
METHADONE UR-MCNC: NEGATIVE — SIGNIFICANT CHANGE UP
MONOCYTES # BLD AUTO: 0.34 K/UL — SIGNIFICANT CHANGE UP (ref 0–0.9)
MONOCYTES NFR BLD AUTO: 6.6 % — SIGNIFICANT CHANGE UP (ref 2–14)
NEUTROPHILS # BLD AUTO: 3.75 K/UL — SIGNIFICANT CHANGE UP (ref 1.8–7.4)
NEUTROPHILS NFR BLD AUTO: 72.2 % — SIGNIFICANT CHANGE UP (ref 43–77)
NRBC # BLD: 0 /100 WBCS — SIGNIFICANT CHANGE UP (ref 0–0)
OPIATES UR-MCNC: NEGATIVE — SIGNIFICANT CHANGE UP
PCP SPEC-MCNC: SIGNIFICANT CHANGE UP
PCP UR-MCNC: NEGATIVE — SIGNIFICANT CHANGE UP
PLATELET # BLD AUTO: 218 K/UL — SIGNIFICANT CHANGE UP (ref 150–400)
POTASSIUM SERPL-MCNC: 3.6 MMOL/L — SIGNIFICANT CHANGE UP (ref 3.5–5.3)
POTASSIUM SERPL-SCNC: 3.6 MMOL/L — SIGNIFICANT CHANGE UP (ref 3.5–5.3)
PROT SERPL-MCNC: 8 G/DL — SIGNIFICANT CHANGE UP (ref 6–8.3)
RBC # BLD: 4.41 M/UL — SIGNIFICANT CHANGE UP (ref 3.8–5.2)
RBC # FLD: 12.9 % — SIGNIFICANT CHANGE UP (ref 10.3–14.5)
SODIUM SERPL-SCNC: 136 MMOL/L — SIGNIFICANT CHANGE UP (ref 135–145)
THC UR QL: POSITIVE
TROPONIN I SERPL-MCNC: <0.015 NG/ML — SIGNIFICANT CHANGE UP (ref 0–0.04)
WBC # BLD: 5.19 K/UL — SIGNIFICANT CHANGE UP (ref 3.8–10.5)
WBC # FLD AUTO: 5.19 K/UL — SIGNIFICANT CHANGE UP (ref 3.8–10.5)

## 2021-06-18 PROCEDURE — 96374 THER/PROPH/DIAG INJ IV PUSH: CPT

## 2021-06-18 PROCEDURE — 83735 ASSAY OF MAGNESIUM: CPT

## 2021-06-18 PROCEDURE — 82550 ASSAY OF CK (CPK): CPT

## 2021-06-18 PROCEDURE — 80053 COMPREHEN METABOLIC PANEL: CPT

## 2021-06-18 PROCEDURE — 84702 CHORIONIC GONADOTROPIN TEST: CPT

## 2021-06-18 PROCEDURE — 36415 COLL VENOUS BLD VENIPUNCTURE: CPT

## 2021-06-18 PROCEDURE — 82962 GLUCOSE BLOOD TEST: CPT

## 2021-06-18 PROCEDURE — 80307 DRUG TEST PRSMV CHEM ANLYZR: CPT

## 2021-06-18 PROCEDURE — 99284 EMERGENCY DEPT VISIT MOD MDM: CPT

## 2021-06-18 PROCEDURE — 85025 COMPLETE CBC W/AUTO DIFF WBC: CPT

## 2021-06-18 PROCEDURE — 93005 ELECTROCARDIOGRAM TRACING: CPT

## 2021-06-18 PROCEDURE — 99284 EMERGENCY DEPT VISIT MOD MDM: CPT | Mod: 25

## 2021-06-18 PROCEDURE — 84484 ASSAY OF TROPONIN QUANT: CPT

## 2021-06-18 RX ORDER — MECLIZINE HCL 12.5 MG
12.5 TABLET ORAL ONCE
Refills: 0 | Status: COMPLETED | OUTPATIENT
Start: 2021-06-18 | End: 2021-06-18

## 2021-06-18 RX ORDER — SODIUM CHLORIDE 9 MG/ML
1000 INJECTION INTRAMUSCULAR; INTRAVENOUS; SUBCUTANEOUS ONCE
Refills: 0 | Status: COMPLETED | OUTPATIENT
Start: 2021-06-18 | End: 2021-06-18

## 2021-06-18 RX ORDER — ONDANSETRON 8 MG/1
1 TABLET, FILM COATED ORAL
Qty: 21 | Refills: 0
Start: 2021-06-18 | End: 2021-06-24

## 2021-06-18 RX ORDER — SODIUM CHLORIDE 9 MG/ML
1000 INJECTION INTRAMUSCULAR; INTRAVENOUS; SUBCUTANEOUS
Refills: 0 | Status: DISCONTINUED | OUTPATIENT
Start: 2021-06-18 | End: 2021-06-22

## 2021-06-18 RX ORDER — METOCLOPRAMIDE HCL 10 MG
10 TABLET ORAL ONCE
Refills: 0 | Status: COMPLETED | OUTPATIENT
Start: 2021-06-18 | End: 2021-06-18

## 2021-06-18 RX ORDER — MECLIZINE HCL 12.5 MG
1 TABLET ORAL
Qty: 21 | Refills: 0
Start: 2021-06-18 | End: 2021-06-24

## 2021-06-18 RX ADMIN — SODIUM CHLORIDE 1000 MILLILITER(S): 9 INJECTION INTRAMUSCULAR; INTRAVENOUS; SUBCUTANEOUS at 17:48

## 2021-06-18 RX ADMIN — Medication 104 MILLIGRAM(S): at 17:47

## 2021-06-18 RX ADMIN — Medication 12.5 MILLIGRAM(S): at 17:47

## 2021-06-18 NOTE — ED ADULT TRIAGE NOTE - ARRIVAL FROM
Home
Nutrition, metabolism, and development symptoms
Nutrition, metabolism, and development symptoms

## 2021-06-18 NOTE — ED PROVIDER NOTE - OBJECTIVE STATEMENT
31 y.o. female LMP 6/8, pt c/o feeling dizzy, vertigo on & off since yest., nausea, vomited numerous times, ataxia, weakness, worse with head movement, no recent cold, ear infection, no fever, CP.  Pt had a near syncope episode

## 2021-06-18 NOTE — ED PROVIDER NOTE - GASTROINTESTINAL [+], MLM
Phoned patient to reinforce wire localization and lymphoscintigraphy education. Patient is scheduled for right breast wire localized lumpectomy and sentinel lymph node biopsy with Dr. Kasey Garcia 5/6/19 at [address] [place] [address].     Provided patient with education concerning wi
NAUSEA/VOMITING

## 2021-06-18 NOTE — ED PROVIDER NOTE - PATIENT PORTAL LINK FT
You can access the FollowMyHealth Patient Portal offered by [facility] by registering at the following website: [email or web address]. By joining [facility]’s FollowMyHealth portal, you will also be able to view your health information using other applications (apps) compatible with our system.

## 2021-07-14 ENCOUNTER — TRANSCRIPTION ENCOUNTER (OUTPATIENT)
Age: 31
End: 2021-07-14

## 2021-07-14 ENCOUNTER — NON-APPOINTMENT (OUTPATIENT)
Age: 31
End: 2021-07-14

## 2021-09-17 ENCOUNTER — TRANSCRIPTION ENCOUNTER (OUTPATIENT)
Age: 31
End: 2021-09-17

## 2021-09-18 PROBLEM — M32.9 SYSTEMIC LUPUS ERYTHEMATOSUS, UNSPECIFIED: Chronic | Status: ACTIVE | Noted: 2021-06-18

## 2021-09-21 ENCOUNTER — APPOINTMENT (OUTPATIENT)
Dept: RHEUMATOLOGY | Facility: CLINIC | Age: 31
End: 2021-09-21
Payer: MEDICAID

## 2021-09-21 VITALS
TEMPERATURE: 96.9 F | WEIGHT: 115 LBS | SYSTOLIC BLOOD PRESSURE: 102 MMHG | RESPIRATION RATE: 16 BRPM | HEIGHT: 62 IN | HEART RATE: 75 BPM | BODY MASS INDEX: 21.16 KG/M2 | OXYGEN SATURATION: 98 % | DIASTOLIC BLOOD PRESSURE: 68 MMHG

## 2021-09-21 PROCEDURE — 99214 OFFICE O/P EST MOD 30 MIN: CPT

## 2021-09-22 LAB
25(OH)D3 SERPL-MCNC: 39.5 NG/ML
ALBUMIN SERPL ELPH-MCNC: 5.1 G/DL
ALP BLD-CCNC: 37 U/L
ALT SERPL-CCNC: 20 U/L
ANION GAP SERPL CALC-SCNC: 17 MMOL/L
APPEARANCE: CLEAR
AST SERPL-CCNC: 18 U/L
BASOPHILS # BLD AUTO: 0.03 K/UL
BASOPHILS NFR BLD AUTO: 0.7 %
BILIRUB SERPL-MCNC: 1 MG/DL
BILIRUBIN URINE: NEGATIVE
BLOOD URINE: NEGATIVE
BUN SERPL-MCNC: 10 MG/DL
CALCIUM SERPL-MCNC: 9.8 MG/DL
CHLORIDE SERPL-SCNC: 99 MMOL/L
CO2 SERPL-SCNC: 22 MMOL/L
COLOR: COLORLESS
COVID-19 SPIKE DOMAIN ANTIBODY INTERPRETATION: POSITIVE
CREAT SERPL-MCNC: 0.61 MG/DL
CRP SERPL-MCNC: <3 MG/L
EOSINOPHIL # BLD AUTO: 0.18 K/UL
EOSINOPHIL NFR BLD AUTO: 4.4 %
ERYTHROCYTE [SEDIMENTATION RATE] IN BLOOD BY WESTERGREN METHOD: 17 MM/HR
GLUCOSE QUALITATIVE U: NEGATIVE
GLUCOSE SERPL-MCNC: 85 MG/DL
HCT VFR BLD CALC: 45.2 %
HGB BLD-MCNC: 14.9 G/DL
IMM GRANULOCYTES NFR BLD AUTO: 0.2 %
KETONES URINE: NEGATIVE
LEUKOCYTE ESTERASE URINE: NEGATIVE
LYMPHOCYTES # BLD AUTO: 1.22 K/UL
LYMPHOCYTES NFR BLD AUTO: 29.8 %
MAN DIFF?: NORMAL
MCHC RBC-ENTMCNC: 32.2 PG
MCHC RBC-ENTMCNC: 33 GM/DL
MCV RBC AUTO: 97.6 FL
MONOCYTES # BLD AUTO: 0.36 K/UL
MONOCYTES NFR BLD AUTO: 8.8 %
NEUTROPHILS # BLD AUTO: 2.29 K/UL
NEUTROPHILS NFR BLD AUTO: 56.1 %
NITRITE URINE: NEGATIVE
PH URINE: 6.5
PLATELET # BLD AUTO: 258 K/UL
POTASSIUM SERPL-SCNC: 4.1 MMOL/L
PROT SERPL-MCNC: 7.9 G/DL
PROTEIN URINE: NEGATIVE
RBC # BLD: 4.63 M/UL
RBC # FLD: 13.1 %
SARS-COV-2 AB SERPL IA-ACNC: 156 U/ML
SODIUM SERPL-SCNC: 138 MMOL/L
SPECIFIC GRAVITY URINE: 1.01
TSH SERPL-ACNC: 2.88 UIU/ML
URATE SERPL-MCNC: 3.7 MG/DL
UROBILINOGEN URINE: NORMAL
WBC # FLD AUTO: 4.09 K/UL

## 2021-09-22 NOTE — ASSESSMENT
[FreeTextEntry1] : Patient with SLE, markedly improved flexion contracture:\par Surveillance labs requested in the setting of MTX use.  Discussed the benefits of continued anti-malarial therapy such as Hydroxychloroquine (HCQ) to help slow progression of disease, including renal involvement as well as as taming of APL antibodies, if present.   Patient understands the increased risk of cardiovascular events related to SLE.  She understands the importance of sun avoidance and the application of SPF protection as well as the use of wide brim hats.\par The importance of dietary and lifestyle modifications was emphasized along with discussions on relaxation, stress-reducing techniques and importance of good sleep hygiene.\par She is in agreement with the above plan and will return in three months' time.\par \par \par \par

## 2021-09-22 NOTE — PHYSICAL EXAM
[General Appearance - Alert] : alert [General Appearance - In No Acute Distress] : in no acute distress [Sclera] : the sclera and conjunctiva were normal [Neck Appearance] : the appearance of the neck was normal [] : no respiratory distress [Auscultation Breath Sounds / Voice Sounds] : lungs were clear to auscultation bilaterally [Heart Sounds] : normal S1 and S2 [Edema] : there was no peripheral edema [No Spinal Tenderness] : no spinal tenderness [Abnormal Walk] : normal gait [Nail Clubbing] : no clubbing  or cyanosis of the fingernails [Musculoskeletal - Swelling] : no joint swelling seen [Motor Tone] : muscle strength and tone were normal [FreeTextEntry1] : No active synovitis  [Skin Color & Pigmentation] : normal skin color and pigmentation [Sensation] : the sensory exam was normal to light touch and pinprick [Motor Exam] : the motor exam was normal [Oriented To Time, Place, And Person] : oriented to person, place, and time [Impaired Insight] : insight and judgment were intact

## 2021-09-22 NOTE — HISTORY OF PRESENT ILLNESS
[FreeTextEntry1] : Patient reports improvement in hand arthralgias as well as improvement over RT elbow flexion contracture; she c/w MTX 4 tabs weekly with FA daily without complication. She finds sleep has been restorative.    She explains eczematous skin has improved as well.  Patient has been home since pandemic start. 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  \par She otherwise denies new c/o of photosensitivity, oral ulcers, dyspnea, cp, motor/ sensory disturbances or fevers.

## 2021-12-17 ENCOUNTER — TRANSCRIPTION ENCOUNTER (OUTPATIENT)
Age: 31
End: 2021-12-17

## 2022-01-27 ENCOUNTER — APPOINTMENT (OUTPATIENT)
Dept: RHEUMATOLOGY | Facility: CLINIC | Age: 32
End: 2022-01-27

## 2022-02-03 ENCOUNTER — APPOINTMENT (OUTPATIENT)
Dept: RHEUMATOLOGY | Facility: CLINIC | Age: 32
End: 2022-02-03
Payer: MEDICAID

## 2022-02-03 VITALS
WEIGHT: 110 LBS | HEART RATE: 66 BPM | TEMPERATURE: 97.9 F | RESPIRATION RATE: 16 BRPM | OXYGEN SATURATION: 95 % | BODY MASS INDEX: 20.24 KG/M2 | DIASTOLIC BLOOD PRESSURE: 61 MMHG | HEIGHT: 62 IN | SYSTOLIC BLOOD PRESSURE: 99 MMHG

## 2022-02-03 PROCEDURE — 99214 OFFICE O/P EST MOD 30 MIN: CPT

## 2022-02-03 NOTE — HISTORY OF PRESENT ILLNESS
[FreeTextEntry1] : Patient reports improvement in hand arthralgias as well as improvement over RT elbow flexion contracture, although she did experience a transient locking sensation a week ago  ; she c/w MTX 4 tabs weekly with FA daily without complication. She finds sleep has been restorative.    She explains eczematous skin has improved as well. \par She has interest in family planning over the next 1-2 years.   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  She otherwise denies new c/o of photosensitivity, oral ulcers, dyspnea, cp, motor/ sensory disturbances or fevers.

## 2022-02-03 NOTE — PHYSICAL EXAM
[General Appearance - Alert] : alert [General Appearance - In No Acute Distress] : in no acute distress [Sclera] : the sclera and conjunctiva were normal [Neck Appearance] : the appearance of the neck was normal [Auscultation Breath Sounds / Voice Sounds] : lungs were clear to auscultation bilaterally [Heart Sounds] : normal S1 and S2 [Edema] : there was no peripheral edema [No Spinal Tenderness] : no spinal tenderness [Abnormal Walk] : normal gait [Nail Clubbing] : no clubbing  or cyanosis of the fingernails [Musculoskeletal - Swelling] : no joint swelling seen [Motor Tone] : muscle strength and tone were normal [FreeTextEntry1] : No active synovitis  [Skin Color & Pigmentation] : normal skin color and pigmentation [] : no rash [Skin Lesions] : no skin lesions [Sensation] : the sensory exam was normal to light touch and pinprick [Motor Exam] : the motor exam was normal [Oriented To Time, Place, And Person] : oriented to person, place, and time [Impaired Insight] : insight and judgment were intact

## 2022-02-03 NOTE — REVIEW OF SYSTEMS
[Fever] : no fever [Chills] : no chills [Eye Pain] : no eye pain [Dry Eyes] : no dryness of the eyes [Sore Throat] : no sore throat [Hoarseness] : no hoarseness [Chest Pain] : no chest pain [Palpitations] : no palpitations [Lower Ext Edema] : no lower extremity edema [Shortness Of Breath] : no shortness of breath [SOB on Exertion] : no shortness of breath during exertion [Arthralgias] : arthralgias [Joint Swelling] : no joint swelling [Joint Stiffness] : joint stiffness [Skin Lesions] : skin lesion [Limb Weakness] : no limb weakness [Difficulty Walking] : no difficulty walking [Depression] : depression

## 2022-02-03 NOTE — ASSESSMENT
[FreeTextEntry1] : Patient with SLE, markedly improved flexion contracture:\par \par Surveillance labs requested in the setting of MTX use. Discussed the teratogenicity associated with methotrexate and the importance of cessation of therapy 90 days prior to conception. Patient understands the importance of contraceptive use if not ready for family planning . Discussed the benefits of continued anti-malarial therapy such as Hydroxychloroquine (HCQ) to help slow progression of disease, including renal involvement as well as as taming of APL antibodies, if present.   Patient understands the increased risk of cardiovascular events related to SLE.  She understands the importance of sun avoidance and the application of SPF protection as well as the use of wide brim hats.\par The importance of dietary and lifestyle modifications was emphasized along with discussions on relaxation, stress-reducing techniques and importance of good sleep hygiene.\par \par She is in agreement with the above plan and will return in three months' time.\par \par \par \par

## 2022-02-04 LAB
25(OH)D3 SERPL-MCNC: 51.4 NG/ML
ALBUMIN SERPL ELPH-MCNC: 4.8 G/DL
ALP BLD-CCNC: 45 U/L
ALT SERPL-CCNC: 25 U/L
ANION GAP SERPL CALC-SCNC: 15 MMOL/L
APPEARANCE: CLEAR
AST SERPL-CCNC: 22 U/L
BASOPHILS # BLD AUTO: 0.02 K/UL
BASOPHILS NFR BLD AUTO: 0.6 %
BILIRUB SERPL-MCNC: 0.6 MG/DL
BILIRUBIN URINE: NEGATIVE
BLOOD URINE: NEGATIVE
BUN SERPL-MCNC: 11 MG/DL
CALCIUM SERPL-MCNC: 9.4 MG/DL
CHLORIDE SERPL-SCNC: 100 MMOL/L
CO2 SERPL-SCNC: 24 MMOL/L
COLOR: COLORLESS
COVID-19 SPIKE DOMAIN ANTIBODY INTERPRETATION: POSITIVE
CREAT SERPL-MCNC: 0.65 MG/DL
CRP SERPL-MCNC: <3 MG/L
EOSINOPHIL # BLD AUTO: 0.1 K/UL
EOSINOPHIL NFR BLD AUTO: 3.2 %
ERYTHROCYTE [SEDIMENTATION RATE] IN BLOOD BY WESTERGREN METHOD: 8 MM/HR
GLUCOSE QUALITATIVE U: NEGATIVE
GLUCOSE SERPL-MCNC: 89 MG/DL
HCT VFR BLD CALC: 43.6 %
HGB BLD-MCNC: 14.2 G/DL
IMM GRANULOCYTES NFR BLD AUTO: 0 %
KETONES URINE: NEGATIVE
LEUKOCYTE ESTERASE URINE: NEGATIVE
LYMPHOCYTES # BLD AUTO: 1.14 K/UL
LYMPHOCYTES NFR BLD AUTO: 36 %
MAN DIFF?: NORMAL
MCHC RBC-ENTMCNC: 32.1 PG
MCHC RBC-ENTMCNC: 32.6 GM/DL
MCV RBC AUTO: 98.6 FL
MONOCYTES # BLD AUTO: 0.34 K/UL
MONOCYTES NFR BLD AUTO: 10.7 %
NEUTROPHILS # BLD AUTO: 1.57 K/UL
NEUTROPHILS NFR BLD AUTO: 49.5 %
NITRITE URINE: NEGATIVE
PH URINE: 6.5
PLATELET # BLD AUTO: 254 K/UL
POTASSIUM SERPL-SCNC: 4.4 MMOL/L
PROT SERPL-MCNC: 7.5 G/DL
PROTEIN URINE: NEGATIVE
RBC # BLD: 4.42 M/UL
RBC # FLD: 13 %
SARS-COV-2 AB SERPL IA-ACNC: >250 U/ML
SODIUM SERPL-SCNC: 140 MMOL/L
SPECIFIC GRAVITY URINE: 1.01
TSH SERPL-ACNC: 1.98 UIU/ML
UROBILINOGEN URINE: NORMAL
WBC # FLD AUTO: 3.17 K/UL

## 2022-04-06 ENCOUNTER — TRANSCRIPTION ENCOUNTER (OUTPATIENT)
Age: 32
End: 2022-04-06

## 2022-05-26 ENCOUNTER — RX RENEWAL (OUTPATIENT)
Age: 32
End: 2022-05-26

## 2022-07-07 ENCOUNTER — TRANSCRIPTION ENCOUNTER (OUTPATIENT)
Age: 32
End: 2022-07-07

## 2022-07-07 ENCOUNTER — RX RENEWAL (OUTPATIENT)
Age: 32
End: 2022-07-07

## 2022-07-11 ENCOUNTER — TRANSCRIPTION ENCOUNTER (OUTPATIENT)
Age: 32
End: 2022-07-11

## 2022-07-11 ENCOUNTER — RX RENEWAL (OUTPATIENT)
Age: 32
End: 2022-07-11

## 2022-07-22 ENCOUNTER — RX RENEWAL (OUTPATIENT)
Age: 32
End: 2022-07-22

## 2022-08-05 ENCOUNTER — RX RENEWAL (OUTPATIENT)
Age: 32
End: 2022-08-05

## 2022-08-05 ENCOUNTER — TRANSCRIPTION ENCOUNTER (OUTPATIENT)
Age: 32
End: 2022-08-05

## 2022-09-01 ENCOUNTER — APPOINTMENT (OUTPATIENT)
Dept: RHEUMATOLOGY | Facility: CLINIC | Age: 32
End: 2022-09-01

## 2022-09-01 VITALS
WEIGHT: 111 LBS | HEIGHT: 62 IN | BODY MASS INDEX: 20.43 KG/M2 | OXYGEN SATURATION: 96 % | RESPIRATION RATE: 16 BRPM | SYSTOLIC BLOOD PRESSURE: 104 MMHG | TEMPERATURE: 98.2 F | HEART RATE: 66 BPM | DIASTOLIC BLOOD PRESSURE: 70 MMHG

## 2022-09-01 DIAGNOSIS — Z79.899 OTHER LONG TERM (CURRENT) DRUG THERAPY: ICD-10-CM

## 2022-09-01 PROCEDURE — 99214 OFFICE O/P EST MOD 30 MIN: CPT

## 2022-09-01 NOTE — HISTORY OF PRESENT ILLNESS
[FreeTextEntry1] : Patient reports improvement in hand arthralgias as well as improvement over RT elbow flexion contracture, which occurs spontaneously as she does not note a relationship with  foods, activity or weather.; she c/w MTX 4 tabs weekly with FA daily without complication. She finds sleep has been restorative.    She explains eczematous skin has improved as well.  She reports maintained appetite and finds mood is stable on current antidepressive therapies.  She is able to focus on tasks and performs ADLs appropriately.\par Patient with LAKESHA negative  +Ro+, +dsDNA , + Sm in 2018 in the setting of intermittent fevers, lymphadenopathy, arthritis and intermittent cytopenias.  She otherwise denies new c/o of photosensitivity, oral ulcers, dyspnea, cp, motor/ sensory disturbances or fevers.

## 2022-09-02 LAB
ALBUMIN SERPL ELPH-MCNC: 4.6 G/DL
ALP BLD-CCNC: 35 U/L
ALT SERPL-CCNC: 17 U/L
ANION GAP SERPL CALC-SCNC: 14 MMOL/L
APPEARANCE: CLEAR
AST SERPL-CCNC: 17 U/L
BASOPHILS # BLD AUTO: 0.04 K/UL
BASOPHILS NFR BLD AUTO: 1.3 %
BILIRUB SERPL-MCNC: 0.7 MG/DL
BILIRUBIN URINE: NEGATIVE
BLOOD URINE: NEGATIVE
BUN SERPL-MCNC: 8 MG/DL
CALCIUM SERPL-MCNC: 9.6 MG/DL
CHLORIDE SERPL-SCNC: 102 MMOL/L
CHOLEST SERPL-MCNC: 189 MG/DL
CO2 SERPL-SCNC: 25 MMOL/L
COLOR: COLORLESS
CREAT SERPL-MCNC: 0.63 MG/DL
CREAT SPEC-SCNC: 35 MG/DL
CREAT/PROT UR: 1.8 RATIO
CRP SERPL-MCNC: <3 MG/L
EGFR: 121 ML/MIN/1.73M2
EOSINOPHIL # BLD AUTO: 0.26 K/UL
EOSINOPHIL NFR BLD AUTO: 8.6 %
ERYTHROCYTE [SEDIMENTATION RATE] IN BLOOD BY WESTERGREN METHOD: 6 MM/HR
ESTIMATED AVERAGE GLUCOSE: 114 MG/DL
GLUCOSE QUALITATIVE U: NEGATIVE
GLUCOSE SERPL-MCNC: 93 MG/DL
HBA1C MFR BLD HPLC: 5.6 %
HCT VFR BLD CALC: 45.8 %
HDLC SERPL-MCNC: 53 MG/DL
HGB BLD-MCNC: 14.5 G/DL
IMM GRANULOCYTES NFR BLD AUTO: 0 %
KETONES URINE: NEGATIVE
LDLC SERPL CALC-MCNC: 116 MG/DL
LEUKOCYTE ESTERASE URINE: NEGATIVE
LYMPHOCYTES # BLD AUTO: 1.37 K/UL
LYMPHOCYTES NFR BLD AUTO: 45.4 %
MAN DIFF?: NORMAL
MCHC RBC-ENTMCNC: 30.9 PG
MCHC RBC-ENTMCNC: 31.7 GM/DL
MCV RBC AUTO: 97.7 FL
MONOCYTES # BLD AUTO: 0.26 K/UL
MONOCYTES NFR BLD AUTO: 8.6 %
NEUTROPHILS # BLD AUTO: 1.09 K/UL
NEUTROPHILS NFR BLD AUTO: 36.1 %
NITRITE URINE: NEGATIVE
NONHDLC SERPL-MCNC: 136 MG/DL
PH URINE: 7.5
PLATELET # BLD AUTO: 248 K/UL
POTASSIUM SERPL-SCNC: 4.7 MMOL/L
PROT SERPL-MCNC: 7.8 G/DL
PROT UR-MCNC: 63 MG/DL
PROTEIN URINE: NEGATIVE
RBC # BLD: 4.69 M/UL
RBC # FLD: 13.7 %
SODIUM SERPL-SCNC: 141 MMOL/L
SPECIFIC GRAVITY URINE: 1.01
TRIGL SERPL-MCNC: 104 MG/DL
TSH SERPL-ACNC: 2.43 UIU/ML
UROBILINOGEN URINE: NORMAL
VIT B12 SERPL-MCNC: 477 PG/ML
WBC # FLD AUTO: 3.02 K/UL

## 2022-09-04 ENCOUNTER — RX RENEWAL (OUTPATIENT)
Age: 32
End: 2022-09-04

## 2022-09-04 LAB
C3 SERPL-MCNC: 74 MG/DL
C4 SERPL-MCNC: 19 MG/DL

## 2022-09-16 ENCOUNTER — TRANSCRIPTION ENCOUNTER (OUTPATIENT)
Age: 32
End: 2022-09-16

## 2022-09-16 ENCOUNTER — RX RENEWAL (OUTPATIENT)
Age: 32
End: 2022-09-16

## 2022-09-16 ENCOUNTER — NON-APPOINTMENT (OUTPATIENT)
Age: 32
End: 2022-09-16

## 2022-09-27 ENCOUNTER — TRANSCRIPTION ENCOUNTER (OUTPATIENT)
Age: 32
End: 2022-09-27

## 2022-09-28 ENCOUNTER — NON-APPOINTMENT (OUTPATIENT)
Age: 32
End: 2022-09-28

## 2022-09-28 ENCOUNTER — TRANSCRIPTION ENCOUNTER (OUTPATIENT)
Age: 32
End: 2022-09-28

## 2022-09-28 DIAGNOSIS — Z23 ENCOUNTER FOR IMMUNIZATION: ICD-10-CM

## 2022-10-13 ENCOUNTER — RX RENEWAL (OUTPATIENT)
Age: 32
End: 2022-10-13

## 2022-11-07 ENCOUNTER — RX RENEWAL (OUTPATIENT)
Age: 32
End: 2022-11-07

## 2023-01-02 ENCOUNTER — TRANSCRIPTION ENCOUNTER (OUTPATIENT)
Age: 33
End: 2023-01-02

## 2023-01-03 ENCOUNTER — APPOINTMENT (OUTPATIENT)
Dept: RHEUMATOLOGY | Facility: CLINIC | Age: 33
End: 2023-01-03
Payer: MEDICAID

## 2023-01-03 VITALS
DIASTOLIC BLOOD PRESSURE: 61 MMHG | BODY MASS INDEX: 19.88 KG/M2 | HEART RATE: 61 BPM | RESPIRATION RATE: 16 BRPM | SYSTOLIC BLOOD PRESSURE: 90 MMHG | OXYGEN SATURATION: 99 % | HEIGHT: 62 IN | TEMPERATURE: 98.1 F | WEIGHT: 108 LBS

## 2023-01-03 DIAGNOSIS — Z23 ENCOUNTER FOR IMMUNIZATION: ICD-10-CM

## 2023-01-03 PROCEDURE — G0008: CPT

## 2023-01-03 PROCEDURE — 90686 IIV4 VACC NO PRSV 0.5 ML IM: CPT

## 2023-01-03 PROCEDURE — 99214 OFFICE O/P EST MOD 30 MIN: CPT | Mod: 25

## 2023-01-03 NOTE — ASSESSMENT
[FreeTextEntry1] : Patient with SLE, markedly improved flexion contracture:\par \par Surveillance labs requested in the setting of MTX use along with repeat urinalysis to assess proteinuria. Discussed the teratogenicity associated with methotrexate and the importance of cessation of therapy 90 days prior to conception. Patient understands the importance of contraceptive use if not ready for family planning . \par Discussed the benefits of continued anti-malarial therapy such as Hydroxychloroquine (HCQ) to help slow progression of disease, including renal involvement .  Patient understands the increased risk of cardiovascular events related to SLE.  She understands the importance of sun avoidance and the application of SPF protection as well as the use of wide brim hats.\par The importance of dietary and lifestyle modifications was emphasized along with discussions on relaxation, stress-reducing techniques and importance of good sleep hygiene.\par \par She is in agreement with the above plan and will return in three months' time.\par \par \par \par

## 2023-01-03 NOTE — PHYSICAL EXAM
[General Appearance - Alert] : alert [General Appearance - In No Acute Distress] : in no acute distress [Neck Appearance] : the appearance of the neck was normal [Sclera] : the sclera and conjunctiva were normal [Heart Sounds] : normal S1 and S2 [Auscultation Breath Sounds / Voice Sounds] : lungs were clear to auscultation bilaterally [Edema] : there was no peripheral edema [No Spinal Tenderness] : no spinal tenderness [Abnormal Walk] : normal gait [Nail Clubbing] : no clubbing  or cyanosis of the fingernails [Musculoskeletal - Swelling] : no joint swelling seen [Motor Tone] : muscle strength and tone were normal [Skin Color & Pigmentation] : normal skin color and pigmentation [] : no rash [Skin Lesions] : no skin lesions [Sensation] : the sensory exam was normal to light touch and pinprick [Motor Exam] : the motor exam was normal [Oriented To Time, Place, And Person] : oriented to person, place, and time [Impaired Insight] : insight and judgment were intact [FreeTextEntry1] : No active synovitis

## 2023-01-03 NOTE — HISTORY OF PRESENT ILLNESS
[FreeTextEntry1] : Patient reports improvement in hand arthralgias as well as improvement over RT elbow flexion contracture, which occurs spontaneously as she does not note a relationship with  foods, activity or weather.; she c/w MTX 4 tabs weekly with FA daily without complication.  Mild leukopenia appreciated in the last CBC.  She has not had recent infections.  She finds sleep has been restorative.    She explains eczematous skin has improved as well.  She reports maintained appetite and finds mood is stable on current antidepressive therapies.  She is able to focus on tasks and performs ADLs appropriately.\par Patient with LAKESHA negative  +Ro+, +dsDNA , + Sm in 2018 in the setting of intermittent fevers, lymphadenopathy, arthritis and intermittent cytopenias.  She otherwise denies new c/o of photosensitivity, oral ulcers, dyspnea, cp, motor/ sensory disturbances or fevers.

## 2023-01-30 ENCOUNTER — TRANSCRIPTION ENCOUNTER (OUTPATIENT)
Age: 33
End: 2023-01-30

## 2023-01-30 ENCOUNTER — RX RENEWAL (OUTPATIENT)
Age: 33
End: 2023-01-30

## 2023-02-01 ENCOUNTER — LABORATORY RESULT (OUTPATIENT)
Age: 33
End: 2023-02-01

## 2023-02-02 LAB
25(OH)D3 SERPL-MCNC: 53 NG/ML
ALBUMIN SERPL ELPH-MCNC: 4.7 G/DL
ALP BLD-CCNC: 35 U/L
ALT SERPL-CCNC: 27 U/L
ANION GAP SERPL CALC-SCNC: 12 MMOL/L
AST SERPL-CCNC: 21 U/L
BILIRUB SERPL-MCNC: 0.9 MG/DL
BUN SERPL-MCNC: 9 MG/DL
CALCIUM SERPL-MCNC: 9.5 MG/DL
CHLORIDE SERPL-SCNC: 101 MMOL/L
CO2 SERPL-SCNC: 25 MMOL/L
CREAT SERPL-MCNC: 0.64 MG/DL
CREAT SPEC-SCNC: 65 MG/DL
CREAT/PROT UR: 0.1 RATIO
CRP SERPL-MCNC: <3 MG/L
EGFR: 120 ML/MIN/1.73M2
ERYTHROCYTE [SEDIMENTATION RATE] IN BLOOD BY WESTERGREN METHOD: 9 MM/HR
GLUCOSE SERPL-MCNC: 88 MG/DL
POTASSIUM SERPL-SCNC: 4.3 MMOL/L
PROT SERPL-MCNC: 7.5 G/DL
PROT UR-MCNC: 9 MG/DL
SODIUM SERPL-SCNC: 139 MMOL/L
TSH SERPL-ACNC: 2.13 UIU/ML

## 2023-02-05 LAB
APPEARANCE: CLEAR
BASOPHILS # BLD AUTO: 0.04 K/UL
BASOPHILS NFR BLD AUTO: 1 %
BILIRUBIN URINE: NEGATIVE
BLOOD URINE: NEGATIVE
COLOR: NORMAL
EOSINOPHIL # BLD AUTO: 1.12 K/UL
EOSINOPHIL NFR BLD AUTO: 31 %
GLUCOSE QUALITATIVE U: NEGATIVE
HCT VFR BLD CALC: 43.5 %
HGB BLD-MCNC: 14.2 G/DL
KETONES URINE: NEGATIVE
LEUKOCYTE ESTERASE URINE: NEGATIVE
LYMPHOCYTES # BLD AUTO: 1.23 K/UL
LYMPHOCYTES NFR BLD AUTO: 34 %
MAN DIFF?: NORMAL
MCHC RBC-ENTMCNC: 32 PG
MCHC RBC-ENTMCNC: 32.6 GM/DL
MCV RBC AUTO: 98 FL
MONOCYTES # BLD AUTO: 0.4 K/UL
MONOCYTES NFR BLD AUTO: 11 %
NEUTROPHILS # BLD AUTO: 0.83 K/UL
NEUTROPHILS NFR BLD AUTO: 22 %
NITRITE URINE: NEGATIVE
PH URINE: 7
PLATELET # BLD AUTO: 203 K/UL
PROTEIN URINE: NEGATIVE
RBC # BLD: 4.44 M/UL
RBC # FLD: 13.7 %
SPECIFIC GRAVITY URINE: 1.01
UROBILINOGEN URINE: NORMAL
WBC # FLD AUTO: 3.62 K/UL

## 2023-02-27 ENCOUNTER — RX RENEWAL (OUTPATIENT)
Age: 33
End: 2023-02-27

## 2023-04-28 ENCOUNTER — RX RENEWAL (OUTPATIENT)
Age: 33
End: 2023-04-28

## 2023-05-02 ENCOUNTER — APPOINTMENT (OUTPATIENT)
Dept: RHEUMATOLOGY | Facility: CLINIC | Age: 33
End: 2023-05-02
Payer: MEDICAID

## 2023-05-02 VITALS
HEIGHT: 62 IN | WEIGHT: 106 LBS | DIASTOLIC BLOOD PRESSURE: 69 MMHG | OXYGEN SATURATION: 98 % | BODY MASS INDEX: 19.51 KG/M2 | SYSTOLIC BLOOD PRESSURE: 104 MMHG | HEART RATE: 73 BPM | RESPIRATION RATE: 16 BRPM | TEMPERATURE: 98.4 F

## 2023-05-02 DIAGNOSIS — Z51.81 ENCOUNTER FOR THERAPEUTIC DRUG LVL MONITORING: ICD-10-CM

## 2023-05-02 DIAGNOSIS — D70.9 NEUTROPENIA, UNSPECIFIED: ICD-10-CM

## 2023-05-02 DIAGNOSIS — L20.9 ATOPIC DERMATITIS, UNSPECIFIED: ICD-10-CM

## 2023-05-02 PROCEDURE — 99214 OFFICE O/P EST MOD 30 MIN: CPT

## 2023-05-02 NOTE — ASSESSMENT
[FreeTextEntry1] : Patient with SLE, markedly improved flexion contracture:\par \par Surveillance labs requested in the setting of MTX use along with repeat urinalysis to assess proteinuria. Discussed the teratogenicity associated with methotrexate and the importance of cessation of therapy 90 days prior to conception. Patient understands the importance of contraceptive use if not ready for family planning . \par Additionally would like follow-up with hematology team in the setting of neutropenia with well-controlled lupus and low-dose methotrexate with recent lymphadenopathy.  \par Discussed the benefits of continued anti-malarial therapy such as Hydroxychloroquine (HCQ) to help slow progression of disease, including renal involvement .  Patient understands the increased risk of cardiovascular events related to SLE.  She understands the importance of sun avoidance and the application of SPF protection as well as the use of wide brim hats.\par The importance of dietary and lifestyle modifications was emphasized along with discussions on relaxation, stress-reducing techniques and importance of good sleep hygiene.\par \par She is in agreement with the above plan and will return in three months' time.\par \par \par \par

## 2023-05-02 NOTE — PHYSICAL EXAM
[General Appearance - Alert] : alert [General Appearance - In No Acute Distress] : in no acute distress [Sclera] : the sclera and conjunctiva were normal [Neck Appearance] : the appearance of the neck was normal [Auscultation Breath Sounds / Voice Sounds] : lungs were clear to auscultation bilaterally [Heart Sounds] : normal S1 and S2 [Edema] : there was no peripheral edema [No Spinal Tenderness] : no spinal tenderness [Abnormal Walk] : normal gait [Nail Clubbing] : no clubbing  or cyanosis of the fingernails [Musculoskeletal - Swelling] : no joint swelling seen [Motor Tone] : muscle strength and tone were normal [Skin Color & Pigmentation] : normal skin color and pigmentation [] : no rash [Skin Lesions] : no skin lesions [Sensation] : the sensory exam was normal to light touch and pinprick [Motor Exam] : the motor exam was normal [Oriented To Time, Place, And Person] : oriented to person, place, and time [Impaired Insight] : insight and judgment were intact [FreeTextEntry1] : No active synovitis

## 2023-05-02 NOTE — HISTORY OF PRESENT ILLNESS
[FreeTextEntry1] : Patient reports improvement in hand arthralgias as well as improvement over RT elbow flexion contracture, which occurs spontaneously as she does not note a relationship with menses, foods, activity or weather.; she c/w MTX 4 tabs weekly with FA daily without complication.  Mild leukopenia appreciated in the last CBC with neutropenia with renal and hepatic values within range.  She explains mild lymphadenopathy noted a few months ago when she was coming down with an upper respiratory infection otherwise is without systemic symptoms.  She finds sleep has been restorative.    She explains eczematous skin has improved as well.  She reports maintained appetite and finds mood is stable on current antidepressive therapies.  She is able to focus on tasks and performs ADLs appropriately.\par \par Patient with LAKESHA negative  +Ro+, +dsDNA , + Sm in 2018 in the setting of intermittent fevers, lymphadenopathy, arthritis and intermittent cytopenias.  She otherwise denies new c/o of photosensitivity, oral ulcers, dyspnea, cp, motor/ sensory disturbances or fevers.

## 2023-06-18 ENCOUNTER — RX RENEWAL (OUTPATIENT)
Age: 33
End: 2023-06-18

## 2023-06-18 ENCOUNTER — TRANSCRIPTION ENCOUNTER (OUTPATIENT)
Age: 33
End: 2023-06-18

## 2023-07-18 ENCOUNTER — TRANSCRIPTION ENCOUNTER (OUTPATIENT)
Age: 33
End: 2023-07-18

## 2023-07-18 ENCOUNTER — RX RENEWAL (OUTPATIENT)
Age: 33
End: 2023-07-18

## 2023-09-26 ENCOUNTER — APPOINTMENT (OUTPATIENT)
Dept: RHEUMATOLOGY | Facility: CLINIC | Age: 33
End: 2023-09-26
Payer: COMMERCIAL

## 2023-09-26 VITALS
SYSTOLIC BLOOD PRESSURE: 104 MMHG | WEIGHT: 100 LBS | RESPIRATION RATE: 16 BRPM | HEART RATE: 81 BPM | TEMPERATURE: 98 F | BODY MASS INDEX: 18.4 KG/M2 | HEIGHT: 62 IN | DIASTOLIC BLOOD PRESSURE: 73 MMHG | OXYGEN SATURATION: 98 %

## 2023-09-26 DIAGNOSIS — R76.8 OTHER SPECIFIED ABNORMAL IMMUNOLOGICAL FINDINGS IN SERUM: ICD-10-CM

## 2023-09-26 DIAGNOSIS — D72.819 DECREASED WHITE BLOOD CELL COUNT, UNSPECIFIED: ICD-10-CM

## 2023-09-26 DIAGNOSIS — R59.1 GENERALIZED ENLARGED LYMPH NODES: ICD-10-CM

## 2023-09-26 PROCEDURE — 99214 OFFICE O/P EST MOD 30 MIN: CPT

## 2023-09-28 ENCOUNTER — TRANSCRIPTION ENCOUNTER (OUTPATIENT)
Age: 33
End: 2023-09-28

## 2023-09-28 DIAGNOSIS — M32.9 SYSTEMIC LUPUS ERYTHEMATOSUS, UNSPECIFIED: ICD-10-CM

## 2023-09-28 DIAGNOSIS — Z00.00 ENCOUNTER FOR GENERAL ADULT MEDICAL EXAMINATION W/OUT ABNORMAL FINDINGS: ICD-10-CM

## 2023-10-02 LAB
CHOLEST SERPL-MCNC: 161 MG/DL
HDLC SERPL-MCNC: 61 MG/DL
LDLC SERPL CALC-MCNC: 83 MG/DL
NONHDLC SERPL-MCNC: 100 MG/DL
TRIGL SERPL-MCNC: 90 MG/DL

## 2023-10-04 ENCOUNTER — TRANSCRIPTION ENCOUNTER (OUTPATIENT)
Age: 33
End: 2023-10-04

## 2023-10-04 RX ORDER — METHOTREXATE 2.5 MG/1
2.5 TABLET ORAL
Qty: 52 | Refills: 1 | Status: ACTIVE | COMMUNITY
Start: 2022-07-22 | End: 1900-01-01

## 2023-10-05 ENCOUNTER — TRANSCRIPTION ENCOUNTER (OUTPATIENT)
Age: 33
End: 2023-10-05

## 2023-10-05 RX ORDER — FOLIC ACID 1 MG/1
1 TABLET ORAL
Qty: 90 | Refills: 2 | Status: ACTIVE | COMMUNITY
Start: 2019-01-24 | End: 1900-01-01

## 2024-01-03 ENCOUNTER — TRANSCRIPTION ENCOUNTER (OUTPATIENT)
Age: 34
End: 2024-01-03

## 2024-01-04 ENCOUNTER — APPOINTMENT (OUTPATIENT)
Dept: RHEUMATOLOGY | Facility: CLINIC | Age: 34
End: 2024-01-04

## 2024-01-17 ENCOUNTER — TRANSCRIPTION ENCOUNTER (OUTPATIENT)
Age: 34
End: 2024-01-17

## 2024-01-17 RX ORDER — HYDROXYCHLOROQUINE SULFATE 200 MG/1
200 TABLET, FILM COATED ORAL
Qty: 30 | Refills: 2 | Status: ACTIVE | COMMUNITY
Start: 2018-06-20 | End: 1900-01-01

## 2024-01-17 RX ORDER — FOLIC ACID 1 MG/1
1 TABLET ORAL
Qty: 90 | Refills: 2 | Status: ACTIVE | COMMUNITY
Start: 2018-10-18 | End: 1900-01-01

## 2024-01-17 RX ORDER — METHOTREXATE 2.5 MG/1
2.5 TABLET ORAL
Qty: 16 | Refills: 3 | Status: ACTIVE | COMMUNITY
Start: 2019-01-24 | End: 1900-01-01

## 2024-11-04 NOTE — REVIEW OF SYSTEMS
What Is The Reason For Today's Visit?: Upper Body Skin Exam [Arthralgias] : arthralgias [Joint Stiffness] : joint stiffness [Skin Lesions] : skin lesion [Depression] : depression [Fever] : no fever [Chills] : no chills [Eye Pain] : no eye pain [Dry Eyes] : no dryness of the eyes [Sore Throat] : no sore throat [Hoarseness] : no hoarseness [Chest Pain] : no chest pain [Palpitations] : no palpitations [Lower Ext Edema] : no lower extremity edema [Shortness Of Breath] : no shortness of breath [SOB on Exertion] : no shortness of breath during exertion [Joint Swelling] : no joint swelling [Limb Weakness] : no limb weakness [Difficulty Walking] : no difficulty walking